# Patient Record
Sex: MALE | Race: WHITE | NOT HISPANIC OR LATINO | URBAN - METROPOLITAN AREA
[De-identification: names, ages, dates, MRNs, and addresses within clinical notes are randomized per-mention and may not be internally consistent; named-entity substitution may affect disease eponyms.]

---

## 2019-07-06 ENCOUNTER — INPATIENT (INPATIENT)
Facility: HOSPITAL | Age: 79
LOS: 1 days | Discharge: HOME | End: 2019-07-08
Attending: INTERNAL MEDICINE | Admitting: INTERNAL MEDICINE
Payer: MEDICARE

## 2019-07-06 VITALS
SYSTOLIC BLOOD PRESSURE: 160 MMHG | HEART RATE: 100 BPM | DIASTOLIC BLOOD PRESSURE: 97 MMHG | OXYGEN SATURATION: 99 % | TEMPERATURE: 96 F | RESPIRATION RATE: 18 BRPM

## 2019-07-06 LAB
ALBUMIN SERPL ELPH-MCNC: 4 G/DL — SIGNIFICANT CHANGE UP (ref 3.5–5.2)
ALP SERPL-CCNC: 70 U/L — SIGNIFICANT CHANGE UP (ref 30–115)
ALT FLD-CCNC: 248 U/L — HIGH (ref 0–41)
ANION GAP SERPL CALC-SCNC: 15 MMOL/L — HIGH (ref 7–14)
APTT BLD: 26.8 SEC — LOW (ref 27–39.2)
AST SERPL-CCNC: 232 U/L — HIGH (ref 0–41)
BASE EXCESS BLDV CALC-SCNC: -3.6 MMOL/L — LOW (ref -2–2)
BILIRUB SERPL-MCNC: 0.5 MG/DL — SIGNIFICANT CHANGE UP (ref 0.2–1.2)
BUN SERPL-MCNC: 24 MG/DL — HIGH (ref 10–20)
CA-I SERPL-SCNC: 1.13 MMOL/L — SIGNIFICANT CHANGE UP (ref 1.12–1.3)
CALCIUM SERPL-MCNC: 8.8 MG/DL — SIGNIFICANT CHANGE UP (ref 8.5–10.1)
CHLORIDE SERPL-SCNC: 105 MMOL/L — SIGNIFICANT CHANGE UP (ref 98–110)
CK SERPL-CCNC: 472 U/L — HIGH (ref 0–225)
CO2 SERPL-SCNC: 20 MMOL/L — SIGNIFICANT CHANGE UP (ref 17–32)
CREAT SERPL-MCNC: 1.2 MG/DL — SIGNIFICANT CHANGE UP (ref 0.7–1.5)
GAS PNL BLDV: 139 MMOL/L — SIGNIFICANT CHANGE UP (ref 136–145)
GAS PNL BLDV: SIGNIFICANT CHANGE UP
GLUCOSE BLDC GLUCOMTR-MCNC: 147 MG/DL — HIGH (ref 70–99)
GLUCOSE SERPL-MCNC: 253 MG/DL — HIGH (ref 70–99)
HCO3 BLDV-SCNC: 22 MMOL/L — SIGNIFICANT CHANGE UP (ref 22–29)
HCT VFR BLD CALC: 41.8 % — LOW (ref 42–52)
HCT VFR BLDA CALC: 41.9 % — SIGNIFICANT CHANGE UP (ref 34–44)
HGB BLD CALC-MCNC: 13.7 G/DL — LOW (ref 14–18)
HGB BLD-MCNC: 13.6 G/DL — LOW (ref 14–18)
INR BLD: 0.99 RATIO — SIGNIFICANT CHANGE UP (ref 0.65–1.3)
LACTATE BLDV-MCNC: 4.4 MMOL/L — HIGH (ref 0.5–1.6)
MAGNESIUM SERPL-MCNC: 2.2 MG/DL — SIGNIFICANT CHANGE UP (ref 1.8–2.4)
MCHC RBC-ENTMCNC: 27.9 PG — SIGNIFICANT CHANGE UP (ref 27–31)
MCHC RBC-ENTMCNC: 32.5 G/DL — SIGNIFICANT CHANGE UP (ref 32–37)
MCV RBC AUTO: 85.8 FL — SIGNIFICANT CHANGE UP (ref 80–94)
NRBC # BLD: 0 /100 WBCS — SIGNIFICANT CHANGE UP (ref 0–0)
NT-PROBNP SERPL-SCNC: 96 PG/ML — SIGNIFICANT CHANGE UP (ref 0–300)
PCO2 BLDV: 42 MMHG — SIGNIFICANT CHANGE UP (ref 41–51)
PH BLDV: 7.33 — SIGNIFICANT CHANGE UP (ref 7.26–7.43)
PLATELET # BLD AUTO: 230 K/UL — SIGNIFICANT CHANGE UP (ref 130–400)
PO2 BLDV: 41 MMHG — HIGH (ref 20–40)
POTASSIUM BLDV-SCNC: 4.2 MMOL/L — SIGNIFICANT CHANGE UP (ref 3.3–5.6)
POTASSIUM SERPL-MCNC: 4.5 MMOL/L — SIGNIFICANT CHANGE UP (ref 3.5–5)
POTASSIUM SERPL-SCNC: 4.5 MMOL/L — SIGNIFICANT CHANGE UP (ref 3.5–5)
PROT SERPL-MCNC: 7.1 G/DL — SIGNIFICANT CHANGE UP (ref 6–8)
PROTHROM AB SERPL-ACNC: 11.4 SEC — SIGNIFICANT CHANGE UP (ref 9.95–12.87)
RBC # BLD: 4.87 M/UL — SIGNIFICANT CHANGE UP (ref 4.7–6.1)
RBC # FLD: 12.8 % — SIGNIFICANT CHANGE UP (ref 11.5–14.5)
SAO2 % BLDV: 73 % — SIGNIFICANT CHANGE UP
SODIUM SERPL-SCNC: 140 MMOL/L — SIGNIFICANT CHANGE UP (ref 135–146)
TROPONIN T SERPL-MCNC: <0.01 NG/ML — SIGNIFICANT CHANGE UP
WBC # BLD: 9.77 K/UL — SIGNIFICANT CHANGE UP (ref 4.8–10.8)
WBC # FLD AUTO: 9.77 K/UL — SIGNIFICANT CHANGE UP (ref 4.8–10.8)

## 2019-07-06 PROCEDURE — 93010 ELECTROCARDIOGRAM REPORT: CPT

## 2019-07-06 PROCEDURE — 72125 CT NECK SPINE W/O DYE: CPT | Mod: 26

## 2019-07-06 PROCEDURE — 99291 CRITICAL CARE FIRST HOUR: CPT

## 2019-07-06 PROCEDURE — 71045 X-RAY EXAM CHEST 1 VIEW: CPT | Mod: 26

## 2019-07-06 PROCEDURE — 70450 CT HEAD/BRAIN W/O DYE: CPT | Mod: 26

## 2019-07-06 RX ORDER — ENOXAPARIN SODIUM 100 MG/ML
40 INJECTION SUBCUTANEOUS EVERY 24 HOURS
Refills: 0 | Status: DISCONTINUED | OUTPATIENT
Start: 2019-07-06 | End: 2019-07-07

## 2019-07-06 RX ORDER — TICAGRELOR 90 MG/1
90 TABLET ORAL EVERY 12 HOURS
Refills: 0 | Status: COMPLETED | OUTPATIENT
Start: 2019-07-06 | End: 2019-07-07

## 2019-07-06 RX ORDER — ATORVASTATIN CALCIUM 80 MG/1
80 TABLET, FILM COATED ORAL AT BEDTIME
Refills: 0 | Status: DISCONTINUED | OUTPATIENT
Start: 2019-07-06 | End: 2019-07-08

## 2019-07-06 RX ORDER — MORPHINE SULFATE 50 MG/1
1 CAPSULE, EXTENDED RELEASE ORAL ONCE
Refills: 0 | Status: DISCONTINUED | OUTPATIENT
Start: 2019-07-06 | End: 2019-07-06

## 2019-07-06 RX ORDER — ASPIRIN/CALCIUM CARB/MAGNESIUM 324 MG
81 TABLET ORAL DAILY
Refills: 0 | Status: DISCONTINUED | OUTPATIENT
Start: 2019-07-06 | End: 2019-07-08

## 2019-07-06 RX ORDER — ASPIRIN/CALCIUM CARB/MAGNESIUM 324 MG
162 TABLET ORAL ONCE
Refills: 0 | Status: COMPLETED | OUTPATIENT
Start: 2019-07-06 | End: 2019-07-06

## 2019-07-06 RX ORDER — MORPHINE SULFATE 50 MG/1
1 CAPSULE, EXTENDED RELEASE ORAL ONCE
Refills: 0 | Status: DISCONTINUED | OUTPATIENT
Start: 2019-07-06 | End: 2019-07-08

## 2019-07-06 RX ORDER — CLOPIDOGREL BISULFATE 75 MG/1
600 TABLET, FILM COATED ORAL ONCE
Refills: 0 | Status: COMPLETED | OUTPATIENT
Start: 2019-07-06 | End: 2019-07-06

## 2019-07-06 RX ADMIN — MORPHINE SULFATE 1 MILLIGRAM(S): 50 CAPSULE, EXTENDED RELEASE ORAL at 15:47

## 2019-07-06 RX ADMIN — TICAGRELOR 90 MILLIGRAM(S): 90 TABLET ORAL at 22:03

## 2019-07-06 RX ADMIN — Medication 162 MILLIGRAM(S): at 10:42

## 2019-07-06 RX ADMIN — MORPHINE SULFATE 1 MILLIGRAM(S): 50 CAPSULE, EXTENDED RELEASE ORAL at 16:54

## 2019-07-06 RX ADMIN — ATORVASTATIN CALCIUM 80 MILLIGRAM(S): 80 TABLET, FILM COATED ORAL at 22:03

## 2019-07-06 RX ADMIN — MORPHINE SULFATE 1 MILLIGRAM(S): 50 CAPSULE, EXTENDED RELEASE ORAL at 21:32

## 2019-07-06 RX ADMIN — CLOPIDOGREL BISULFATE 600 MILLIGRAM(S): 75 TABLET, FILM COATED ORAL at 10:42

## 2019-07-06 RX ADMIN — MORPHINE SULFATE 1 MILLIGRAM(S): 50 CAPSULE, EXTENDED RELEASE ORAL at 21:35

## 2019-07-06 NOTE — H&P ADULT - NSHPPHYSICALEXAM_GEN_ALL_CORE
PHYSICAL EXAM:  GENERAL: NAD, speaks in full sentences, no signs of respiratory distress  HEAD:  Atraumatic, Normocephalic  EYES: Anicteric  NECK: Supple, No JVD  CHEST/LUNG: Clear to auscultation bilaterally; No wheeze; No crackles; No accessory muscles used  HEART: Regular rate and rhythm; No murmurs;   ABDOMEN: Soft, Nontender, Nondistended; Bowel sounds present; No guarding  EXTREMITIES:  2+ Peripheral Pulses, No cyanosis or edema  PSYCH: AAOx3  NEUROLOGY: non-focal  SKIN: No rashes or lesions

## 2019-07-06 NOTE — ED ADULT NURSE NOTE - CHIEF COMPLAINT QUOTE
pre note, post cardiac arrest, awake and alert, chest pain pre note, post cardiac arrest, ROSC, awake and alert on arrival, chest pain

## 2019-07-06 NOTE — ED PROVIDER NOTE - PHYSICAL EXAMINATION
Vital Signs: I have reviewed the initial vital signs.  Constitutional: NAD, well-nourished, appears stated age, no acute distress. mildly diaphoretic  HEENT: Airway patent, moist MM, no erythema/swelling/deformity of oral structures. EOMI, PERRLA.  CV: regular rate, regular rhythm, well-perfused extremities, 2+ b/l DP and radial pulses equal.  Lungs: BCTA, no increased WOB.  ABD: NTND, no guarding or rebound, no pulsatile mass, no hernias.   MSK: Neck supple, nontender, nl ROM, no stepoff. Chest nontender. Back nontender in TLS spine or to b/l bony structures or flanks. Ext nontender, nl rom, no deformity.   INTEG: Skin warm, dry, no rash.  NEURO: A&Ox3, moving all extremities, normal speech  PSYCH: Calm, cooperative, normal affect and interaction.

## 2019-07-06 NOTE — ED PROVIDER NOTE - CARE PLAN
Principal Discharge DX:	Cardiac arrest with ventricular fibrillation  Secondary Diagnosis:	ST elevation myocardial infarction (STEMI), unspecified artery

## 2019-07-06 NOTE — H&P ADULT - ASSESSMENT
79 yo M with no PMHx, no home meds, hasn't seen physician in years, presents to ER in vfib cardiac arrest. Pt obtained ROSC upon arrival, STEMI found. Had cardiac cath showing occulsion in LAD and LCx    STEMI  -s/p cath  -c/w aspirin, lipitor, brillinta  -pt requires ICU monitoring  -2d Echo pending    DVT px  Full Code  From home

## 2019-07-06 NOTE — ED PROVIDER NOTE - CRITICAL CARE PROVIDED
consult w/ pt's family directly relating to pts condition/additional history taking/interpretation of diagnostic studies/consultation with other physicians/documentation/direct patient care (not related to procedure)

## 2019-07-06 NOTE — ED PROVIDER NOTE - OBJECTIVE STATEMENT
78yM with no significant PMH and surgical history presents via ems with CP, s/p cardiac arrest with rosc. The symptoms started today morning. He was feeling pale and had chest discomfort. He also passed out while sitting on the chair talking to the family he hit his head. Son found him pulseless and immediately initiated CPR. EMS was called, found to be in Vfib, shocked with ROSC in field. no meds given. code stemi activated on arrival to ed and pt went directly to cath lab after initial eval.

## 2019-07-06 NOTE — CONSULT NOTE ADULT - ASSESSMENT
79 YO M with no significant PMH reported by the patient, however, he does not see any doctors, brought in from home s/p cardiac arrest, found to have STEMI, to be taken up to cath lab.    A & P:    # STEMI:  - typical chest pain, with ST elevations in II, III, aVF  - give , Plavix [id number]  - plan to take up to the cath lab for PCI  - Detailed PCI/cath note to follow

## 2019-07-06 NOTE — ED ADULT TRIAGE NOTE - CHIEF COMPLAINT QUOTE
pre note, post cardiac arrest, awake and alert, chest pain pre note, post cardiac arrest, ROSC, awake and alert, chest pain

## 2019-07-06 NOTE — ED PROVIDER NOTE - ATTENDING CONTRIBUTION TO CARE
78 y.o. male BIBA after v.fib cardiac arrest. Pt was c/o chest pressure earlier today,  then collapsed on the floor, wife called 911, when they arrived he was found to be in v.fib and agonal breathing. Was shocked once, had CPR for less than 2 min, no meds. Now awake and alert, c/o chest pressure. No HA, neck pain, n/v/c/d. No diaphoresis. (+) mild SOB. On exam, pt in NAD, AAOx3, head (+) small abrasion to forehead, CN II-XII intact, lungs CTA B/L, CV S1S2 regular, abdomen soft/NT/ND/(+)BS, ext (-) edema, motor 5/5x4, sensation intact. STEMI activated on arrival. Pt to go to CATH lab.

## 2019-07-06 NOTE — CHART NOTE - NSCHARTNOTEFT_GEN_A_CORE
Preliminary Cardiac Catheterization Post-Procedure Report:07-06-19 @ 12:44    Procedure Performed:  [X] Left Heart Catheterization  [ ] Right Heart Catheterization  [X] Percutaneous Coronary Intervention    Primary Physician: Dr. Mi MD  Assistant(s): Dr. Agusto MD and  Dr. Mendoza MD    Preliminary Procedure Summary (Official full report to follow)    Pre-procedure diagnosis: STEMI  Post Procedure Diagnosis/Impression: STEMI with AILYN to proximal LCX    Left Heart Catheterization:  approximate EF%: 35%  [ ] Normal Coronary Arteries  [ ] Luminal Irregularities  [ ] non-obstructive CAD  [X] 2 vessel coronary artery disease       Anesthesia Type  [x] conscious sedation  [x] local/regional anesthesia  [  ] general anesthesia    Estimated Blood Loss  [x ] less than 30 ml    Amount of Contrast used:  150 ml    Access  [ ] Rt. Femoral A  [ ] Rt. Femoral V  [ x] Rt. Radial A  [ ] Rt. Brachial V    Condition of patient after procedure  [x] stable  [  ] guarded  [  ] satisfactory     CATH SUMMARY/FINDINGS:    Dominance:   [X] Co-dominant  [ ] Right  [ ] Left                  Coronary circulation: There was 2-vessel coronary artery disease (LAD and circumflex). Left main: Angiography showed a single discrete lesion. Distal left main: There was a 40 % stenosis. LAD: Angiography showed a single discrete lesion. Mid LAD: There was a discrete 70 % stenosis. It appears amenable to percutaneous intervention. Circumflex: Angiography showed a single discrete lesion. Proximal circumflex: There was a tubular 95 % stenosis. This lesion is a likely culprit for the patient's recent myocardial infarction. An intervention was performed. RCA: Angiography showed a single discrete lesion. Mid RCA: Angiography showed mild atherosclerosis with no flow limiting lesions. Distal RCA: There was a tubular 40 % stenosis just before RPDA.     Lesion #1 intervention: A balloon angioplasty was performed on the lesion in the proximal circumflex. There was no dissection. Vessel setup was performed. A EBU3.0 Launcher guiding catheter was used to intubate the vessel. A Runthrough NS 180cm wire was used to cross the lesion. Vessel setup was performed. A BMW .014 x 190cm wire was used to protect a branch in the LAD. Balloon angioplasty was performed, using a 2.0 x 12 NC Quantum [place] RX balloon, with 2 inflations and a maximum inflation pressure of 10 raj.   A 2.75 X 16 Synergy RX drug-eluting stent was placed across the lesion and deployed at a maximum inflation pressure of 14 raj.     Lesion #2 intervention: A successful balloon angioplasty was performed on the 80 % lesion in the 1st obtuse marginal. Following intervention there was an improved angiographic appearance with a 10 % residual stenosis.   Balloon angioplasty was performed, using a 2.0 x 12 NC Quantum [place] RX balloon, with 1 inflations and a maximum inflation pressure of 4 raj. During the procedure, a new Runthrough NS 180cm wire was advanced across the lesion. The resulting stenosis was 10 %.       Specimens obtained: n/a    Implants: AILYN x 1    Follow-up Care:  [ ] D/C Home today  [ ] Return to In-patient bed  [X] Admit to CCU  [ ] Return for staged procedure  [ ] CT Surgery consult called  [X] DAPT, statin, BB, ACEI  [x ] intensive medical management  [ ] EPS/nephrology evaluation requested

## 2019-07-06 NOTE — H&P ADULT - NSHPLABSRESULTS_GEN_ALL_CORE
13.6   11.57 )-----------( 203      ( 07 Jul 2019 05:37 )             41.8       07-07    138  |  104  |  22<H>  ----------------------------<  119<H>  4.0   |  23  |  1.0    Ca    8.9      07 Jul 2019 05:37  Mg     2.2     07-06    TPro  7.1  /  Alb  4.0  /  TBili  0.5  /  DBili  x   /  AST  232<H>  /  ALT  248<H>  /  AlkPhos  70  07-06                  PT/INR - ( 06 Jul 2019 10:31 )   PT: 11.40 sec;   INR: 0.99 ratio         PTT - ( 06 Jul 2019 10:31 )  PTT:26.8 sec    Lactate Trend      CARDIAC MARKERS ( 07 Jul 2019 05:37 )  x     / 1.97 ng/mL / x     / x     / x      CARDIAC MARKERS ( 06 Jul 2019 17:34 )  x     / x     / 472 U/L / x     / x      CARDIAC MARKERS ( 06 Jul 2019 10:31 )  x     / <0.01 ng/mL / x     / x     / x            CAPILLARY BLOOD GLUCOSE      POCT Blood Glucose.: 147 mg/dL (06 Jul 2019 12:27)        EKG reviewed independently-STEMI

## 2019-07-06 NOTE — CHART NOTE - NSCHARTNOTEFT_GEN_A_CORE
STEMI code overheaded to which I responded and arrived to asses patient. EKG reviewed and patient examined personally by myself. Case d/w Interventional Cardiology attending (Dr. Stark), patient assessed to require emergent cardiac catheterization. Patient given medical treatment as per ACS protocol, will be transferred to cath lab for emergent PCI. Patient informed regarding this and agrees to treatment plan. Case d/w ED staff by myself. Detailed consult note to follow.   EKG: ST elevations in inf limb leads

## 2019-07-06 NOTE — ED ADULT NURSE NOTE - OBJECTIVE STATEMENT
pt presents s/p cardiac arrest, witnessed collapse, CPR, vfib, shocked, no meds given on field, ROSC achieved, awake and alert, c-collar in place.

## 2019-07-06 NOTE — ED ADULT NURSE NOTE - NSIMPLEMENTINTERV_GEN_ALL_ED
Implemented All Fall with Harm Risk Interventions:  [place] to call system. Call bell, personal items and telephone within reach. Instruct patient to call for assistance. Room bathroom lighting operational. Non-slip footwear when patient is off stretcher. Physically safe environment: no spills, clutter or unnecessary equipment. Stretcher in lowest position, wheels locked, appropriate side rails in place. Provide visual cue, wrist band, yellow gown, etc. Monitor gait and stability. Monitor for mental status changes and reorient to person, place, and time. Review medications for side effects contributing to fall risk. Reinforce activity limits and safety measures with patient and family. Provide visual clues: red socks.

## 2019-07-06 NOTE — CONSULT NOTE ADULT - SUBJECTIVE AND OBJECTIVE BOX
HPI:  77 YO M with no significant PMH reported by the patient, however, he does not see any doctors, brought in from home s/p cardiac arrest. As per the wife pt. complained of retrosternal chest pressure and not feeling well, once he came home from walking outside, and collpased in the chair. EMS was called who found the patient in V.Fib, shocked him once and coded for 2 minutes, until ROSC was achieved.   Pt. was found to have STEMI with RADHA in inferior leads.     In the ER pt's BP was 160/97, , Temp 96.4F, SaO2 99% on Nc    PAST MEDICAL & SURGICAL HISTORY  None reported    FAMILY HISTORY:  No pertinent family history of premature CAD      SOCIAL HISTORY:  []smoker  []Alcohol  []Drug    ALLERGIES:  No Known Allergies      MEDICATIONS:  MEDICATIONS  (STANDING):   mg X1  Plavix 600 mg X1  MEDICATIONS  (PRN):      HOME MEDICATIONS:  Home Medications:  nONE    VITALS:   T(F): 96.4 (07-06 @ 10:34), Max: 96.4 (07-06 @ 10:28)  HR: 81 (07-06 @ 10:42) (79 - 100)  BP: 169/98 (07-06 @ 10:42) (156/90 - 169/98)  BP(mean): --  RR: 17 (07-06 @ 10:34) (17 - 18)  SpO2: 99% (07-06 @ 10:34) (99% - 99%)    I&O's Summary      REVIEW OF SYSTEMS:  CONSTITUTIONAL: No weakness, fevers or chills  EYES/ENT: No visual changes;  No vertigo or throat pain   NECK: No pain or stiffness  RESPIRATORY: No cough, wheezing, hemoptysis; No shortness of breath  CARDIOVASCULAR: Chest pain as per hpi,, NO  palpitations  GASTROINTESTINAL: No abdominal or epigastric pain. No nausea, vomiting, or hematemesis; No diarrhea or constipation. No melena or hematochezia.  GENITOURINARY: No dysuria, frequency or hematuria  NEUROLOGICAL: No numbness or weakness  SKIN: No itching, no rashes    PHYSICAL EXAM:  NEURO: patient is awake , alert and oriented  GEN: Not in acute distress  NECK: no thyroid enlargement, no JVD  LUNGS: Clear to auscultation bilaterally   CARDIOVASCULAR: S1/S2 present, RRR , no murmurs or rubs, no carotid bruits,  + PP bilaterally  ABD: Soft, non-tender, non-distended, +BS  EXT: No LEANDRA  SKIN: Intact    LABS:                        13.6   9.77  )-----------( 230      ( 06 Jul 2019 10:31 )             41.8     07-06    140  |  105  |  24<H>  ----------------------------<  253<H>  4.5   |  20  |  1.2    Ca    8.8      06 Jul 2019 10:31  Mg     2.2     07-06    TPro  7.1  /  Alb  4.0  /  TBili  0.5  /  DBili  x   /  AST  232<H>  /  ALT  248<H>  /  AlkPhos  70  07-06    PT/INR - ( 06 Jul 2019 10:31 )   PT: 11.40 sec;   INR: 0.99 ratio         PTT - ( 06 Jul 2019 10:31 )  PTT:26.8 sec  Troponin T, Serum: <0.01 ng/mL (07-06-19 @ 10:31)    CARDIAC MARKERS ( 06 Jul 2019 10:31 )  x     / <0.01 ng/mL / x     / x     / x        Serum Pro-Brain Natriuretic Peptide: 96 pg/mL (07-06-19 @ 10:31)      ECG:  ST elevations in II, III, aVF

## 2019-07-07 LAB
ANION GAP SERPL CALC-SCNC: 11 MMOL/L — SIGNIFICANT CHANGE UP (ref 7–14)
BASOPHILS # BLD AUTO: 0.01 K/UL — SIGNIFICANT CHANGE UP (ref 0–0.2)
BASOPHILS NFR BLD AUTO: 0.1 % — SIGNIFICANT CHANGE UP (ref 0–1)
BUN SERPL-MCNC: 22 MG/DL — HIGH (ref 10–20)
CALCIUM SERPL-MCNC: 8.9 MG/DL — SIGNIFICANT CHANGE UP (ref 8.5–10.1)
CHLORIDE SERPL-SCNC: 104 MMOL/L — SIGNIFICANT CHANGE UP (ref 98–110)
CHOLEST SERPL-MCNC: 253 MG/DL — HIGH (ref 100–200)
CO2 SERPL-SCNC: 23 MMOL/L — SIGNIFICANT CHANGE UP (ref 17–32)
CREAT SERPL-MCNC: 1 MG/DL — SIGNIFICANT CHANGE UP (ref 0.7–1.5)
EOSINOPHIL # BLD AUTO: 0.03 K/UL — SIGNIFICANT CHANGE UP (ref 0–0.7)
EOSINOPHIL NFR BLD AUTO: 0.3 % — SIGNIFICANT CHANGE UP (ref 0–8)
GLUCOSE SERPL-MCNC: 119 MG/DL — HIGH (ref 70–99)
HCT VFR BLD CALC: 41.8 % — LOW (ref 42–52)
HDLC SERPL-MCNC: 42 MG/DL — SIGNIFICANT CHANGE UP
HGB BLD-MCNC: 13.6 G/DL — LOW (ref 14–18)
IMM GRANULOCYTES NFR BLD AUTO: 0.2 % — SIGNIFICANT CHANGE UP (ref 0.1–0.3)
LIPID PNL WITH DIRECT LDL SERPL: 203 MG/DL — HIGH (ref 4–129)
LYMPHOCYTES # BLD AUTO: 1.03 K/UL — LOW (ref 1.2–3.4)
LYMPHOCYTES # BLD AUTO: 8.9 % — LOW (ref 20.5–51.1)
MCHC RBC-ENTMCNC: 27.7 PG — SIGNIFICANT CHANGE UP (ref 27–31)
MCHC RBC-ENTMCNC: 32.5 G/DL — SIGNIFICANT CHANGE UP (ref 32–37)
MCV RBC AUTO: 85.1 FL — SIGNIFICANT CHANGE UP (ref 80–94)
MONOCYTES # BLD AUTO: 1.24 K/UL — HIGH (ref 0.1–0.6)
MONOCYTES NFR BLD AUTO: 10.7 % — HIGH (ref 1.7–9.3)
NEUTROPHILS # BLD AUTO: 9.24 K/UL — HIGH (ref 1.4–6.5)
NEUTROPHILS NFR BLD AUTO: 79.8 % — HIGH (ref 42.2–75.2)
NRBC # BLD: 0 /100 WBCS — SIGNIFICANT CHANGE UP (ref 0–0)
PLATELET # BLD AUTO: 203 K/UL — SIGNIFICANT CHANGE UP (ref 130–400)
POTASSIUM SERPL-MCNC: 4 MMOL/L — SIGNIFICANT CHANGE UP (ref 3.5–5)
POTASSIUM SERPL-SCNC: 4 MMOL/L — SIGNIFICANT CHANGE UP (ref 3.5–5)
RBC # BLD: 4.91 M/UL — SIGNIFICANT CHANGE UP (ref 4.7–6.1)
RBC # FLD: 12.8 % — SIGNIFICANT CHANGE UP (ref 11.5–14.5)
SODIUM SERPL-SCNC: 138 MMOL/L — SIGNIFICANT CHANGE UP (ref 135–146)
TOTAL CHOLESTEROL/HDL RATIO MEASUREMENT: 6 RATIO — HIGH (ref 4–5.5)
TRIGL SERPL-MCNC: 129 MG/DL — SIGNIFICANT CHANGE UP (ref 10–149)
TROPONIN T SERPL-MCNC: 1.97 NG/ML — CRITICAL HIGH
WBC # BLD: 11.57 K/UL — HIGH (ref 4.8–10.8)
WBC # FLD AUTO: 11.57 K/UL — HIGH (ref 4.8–10.8)

## 2019-07-07 PROCEDURE — 99223 1ST HOSP IP/OBS HIGH 75: CPT

## 2019-07-07 PROCEDURE — 93010 ELECTROCARDIOGRAM REPORT: CPT

## 2019-07-07 PROCEDURE — 93306 TTE W/DOPPLER COMPLETE: CPT | Mod: 26

## 2019-07-07 PROCEDURE — 76705 ECHO EXAM OF ABDOMEN: CPT | Mod: 26

## 2019-07-07 RX ORDER — MORPHINE SULFATE 50 MG/1
2 CAPSULE, EXTENDED RELEASE ORAL ONCE
Refills: 0 | Status: DISCONTINUED | OUTPATIENT
Start: 2019-07-07 | End: 2019-07-07

## 2019-07-07 RX ORDER — ENOXAPARIN SODIUM 100 MG/ML
40 INJECTION SUBCUTANEOUS DAILY
Refills: 0 | Status: DISCONTINUED | OUTPATIENT
Start: 2019-07-07 | End: 2019-07-08

## 2019-07-07 RX ORDER — ACETAMINOPHEN 500 MG
650 TABLET ORAL ONCE
Refills: 0 | Status: COMPLETED | OUTPATIENT
Start: 2019-07-07 | End: 2019-07-07

## 2019-07-07 RX ORDER — METOPROLOL TARTRATE 50 MG
12.5 TABLET ORAL EVERY 12 HOURS
Refills: 0 | Status: DISCONTINUED | OUTPATIENT
Start: 2019-07-07 | End: 2019-07-08

## 2019-07-07 RX ADMIN — TICAGRELOR 90 MILLIGRAM(S): 90 TABLET ORAL at 09:01

## 2019-07-07 RX ADMIN — ATORVASTATIN CALCIUM 80 MILLIGRAM(S): 80 TABLET, FILM COATED ORAL at 21:09

## 2019-07-07 RX ADMIN — Medication 650 MILLIGRAM(S): at 17:30

## 2019-07-07 RX ADMIN — Medication 81 MILLIGRAM(S): at 13:29

## 2019-07-07 RX ADMIN — ENOXAPARIN SODIUM 40 MILLIGRAM(S): 100 INJECTION SUBCUTANEOUS at 13:29

## 2019-07-07 RX ADMIN — TICAGRELOR 90 MILLIGRAM(S): 90 TABLET ORAL at 17:51

## 2019-07-07 RX ADMIN — MORPHINE SULFATE 2 MILLIGRAM(S): 50 CAPSULE, EXTENDED RELEASE ORAL at 06:43

## 2019-07-07 RX ADMIN — Medication 650 MILLIGRAM(S): at 14:10

## 2019-07-07 RX ADMIN — Medication 650 MILLIGRAM(S): at 23:39

## 2019-07-07 RX ADMIN — Medication 650 MILLIGRAM(S): at 23:08

## 2019-07-07 RX ADMIN — Medication 12.5 MILLIGRAM(S): at 17:51

## 2019-07-07 RX ADMIN — Medication 650 MILLIGRAM(S): at 08:00

## 2019-07-07 RX ADMIN — MORPHINE SULFATE 2 MILLIGRAM(S): 50 CAPSULE, EXTENDED RELEASE ORAL at 07:24

## 2019-07-07 NOTE — PROGRESS NOTE ADULT - SUBJECTIVE AND OBJECTIVE BOX
SUBJ: was dizzy when got up      MEDICATIONS  (STANDING):  aspirin  chewable 81 milliGRAM(s) Chew daily  atorvastatin 80 milliGRAM(s) Oral at bedtime  enoxaparin Injectable 40 milliGRAM(s) SubCutaneous daily  morphine  - Injectable 1 milliGRAM(s) IV Push once  ticagrelor 90 milliGRAM(s) Oral every 12 hours    MEDICATIONS  (PRN):            Vital Signs Last 24 Hrs  T(C): 36.8 (07 Jul 2019 11:32), Max: 37.1 (07 Jul 2019 00:00)  T(F): 98.2 (07 Jul 2019 11:32), Max: 98.8 (07 Jul 2019 00:00)  HR: 68 (07 Jul 2019 11:32) (54 - 90)  BP: 136/69 (07 Jul 2019 11:32) (107/62 - 149/79)  BP(mean): 89 (07 Jul 2019 11:32) (75 - 112)  RR: 17 (07 Jul 2019 11:32) (12 - 33)  SpO2: 96% (07 Jul 2019 11:32) (94% - 99%)     REVIEW OF SYSTEMS:  CONSTITUTIONAL: No fever, weight loss, or fatigue  CARDIOLOGY: PAtient denies chest pain, shortness of breath or syncopal episodes.   RESPIRATORY: denies shortness of breath, wheezeing.   NEUROLOGICAL: NO weakness, no focal deficits to report.  ENDOCRINOLOGICAL: no recent change in diabetic medications.   GI: no BRBPR, no N,V,diarrhea.    PSYCHIATRY: normal mood and affect  HEENT: no nasal discharge, no ecchymosis  SKIN: no ecchymosis, no breakdown  MUSCULOSKELETAL: Full range of motion x4.        PHYSICAL EXAM:  · CONSTITUTIONAL:	Well-developed, well nourished    BMI-  ·RESPIRATORY:   airway patent; breath sounds equal; good air movement; respirations non-labored; clear to auscultation bilaterally; no chest wall tenderness; no intercostal retractions; no rales,rhonchi or wheeze  · CARDIOVASCULAR	regular rate and rhythm  no rub  no murmur  normal PMI  · EXTREMITIES: No cyanosis, clubbing or edema  · VASCULAR: 	Equal and normal pulses (carotid, femoral, dorsalis pedis)  	  TELEMETRY:    ECG:    TTE:    LABS:                        13.6   11.57 )-----------( 203      ( 07 Jul 2019 05:37 )             41.8     07-07    138  |  104  |  22<H>  ----------------------------<  119<H>  4.0   |  23  |  1.0    Ca    8.9      07 Jul 2019 05:37  Mg     2.2     07-06    TPro  7.1  /  Alb  4.0  /  TBili  0.5  /  DBili  x   /  AST  232<H>  /  ALT  248<H>  /  AlkPhos  70  07-06    CARDIAC MARKERS ( 07 Jul 2019 05:37 )  x     / 1.97 ng/mL / x     / x     / x      CARDIAC MARKERS ( 06 Jul 2019 17:34 )  x     / x     / 472 U/L / x     / x      CARDIAC MARKERS ( 06 Jul 2019 10:31 )  x     / <0.01 ng/mL / x     / x     / x          PT/INR - ( 06 Jul 2019 10:31 )   PT: 11.40 sec;   INR: 0.99 ratio         PTT - ( 06 Jul 2019 10:31 )  PTT:26.8 sec    I&O's Summary    06 Jul 2019 07:01  -  07 Jul 2019 07:00  --------------------------------------------------------  IN: 0 mL / OUT: 1140 mL / NET: -1140 mL    07 Jul 2019 07:01  -  07 Jul 2019 11:58  --------------------------------------------------------  IN: 120 mL / OUT: 0 mL / NET: 120 mL      BNP  RADIOLOGY & ADDITIONAL STUDIES:    IMPRESSION AND PLAN:    Vfib cardiac arrest  PCI of the LCX with AILYN  continue with DAPT, start with Bblockers  follow up with echo

## 2019-07-08 ENCOUNTER — TRANSCRIPTION ENCOUNTER (OUTPATIENT)
Age: 79
End: 2019-07-08

## 2019-07-08 VITALS
HEART RATE: 72 BPM | TEMPERATURE: 98 F | SYSTOLIC BLOOD PRESSURE: 137 MMHG | DIASTOLIC BLOOD PRESSURE: 75 MMHG | OXYGEN SATURATION: 97 % | RESPIRATION RATE: 21 BRPM

## 2019-07-08 LAB
ANION GAP SERPL CALC-SCNC: 12 MMOL/L — SIGNIFICANT CHANGE UP (ref 7–14)
BUN SERPL-MCNC: 20 MG/DL — SIGNIFICANT CHANGE UP (ref 10–20)
CALCIUM SERPL-MCNC: 8.8 MG/DL — SIGNIFICANT CHANGE UP (ref 8.5–10.1)
CHLORIDE SERPL-SCNC: 103 MMOL/L — SIGNIFICANT CHANGE UP (ref 98–110)
CK SERPL-CCNC: 418 U/L — HIGH (ref 0–225)
CO2 SERPL-SCNC: 24 MMOL/L — SIGNIFICANT CHANGE UP (ref 17–32)
CREAT SERPL-MCNC: 1 MG/DL — SIGNIFICANT CHANGE UP (ref 0.7–1.5)
ESTIMATED AVERAGE GLUCOSE: 117 MG/DL — HIGH (ref 68–114)
GLUCOSE SERPL-MCNC: 109 MG/DL — HIGH (ref 70–99)
HBA1C BLD-MCNC: 5.7 % — HIGH (ref 4–5.6)
HCT VFR BLD CALC: 40.5 % — LOW (ref 42–52)
HGB BLD-MCNC: 13.1 G/DL — LOW (ref 14–18)
MCHC RBC-ENTMCNC: 27.6 PG — SIGNIFICANT CHANGE UP (ref 27–31)
MCHC RBC-ENTMCNC: 32.3 G/DL — SIGNIFICANT CHANGE UP (ref 32–37)
MCV RBC AUTO: 85.4 FL — SIGNIFICANT CHANGE UP (ref 80–94)
NRBC # BLD: 0 /100 WBCS — SIGNIFICANT CHANGE UP (ref 0–0)
PLATELET # BLD AUTO: 217 K/UL — SIGNIFICANT CHANGE UP (ref 130–400)
POTASSIUM SERPL-MCNC: 4.2 MMOL/L — SIGNIFICANT CHANGE UP (ref 3.5–5)
POTASSIUM SERPL-SCNC: 4.2 MMOL/L — SIGNIFICANT CHANGE UP (ref 3.5–5)
RBC # BLD: 4.74 M/UL — SIGNIFICANT CHANGE UP (ref 4.7–6.1)
RBC # FLD: 12.9 % — SIGNIFICANT CHANGE UP (ref 11.5–14.5)
SODIUM SERPL-SCNC: 139 MMOL/L — SIGNIFICANT CHANGE UP (ref 135–146)
TROPONIN T SERPL-MCNC: 1.26 NG/ML — CRITICAL HIGH
WBC # BLD: 9.91 K/UL — SIGNIFICANT CHANGE UP (ref 4.8–10.8)
WBC # FLD AUTO: 9.91 K/UL — SIGNIFICANT CHANGE UP (ref 4.8–10.8)

## 2019-07-08 PROCEDURE — 93010 ELECTROCARDIOGRAM REPORT: CPT

## 2019-07-08 PROCEDURE — 99238 HOSP IP/OBS DSCHRG MGMT 30/<: CPT

## 2019-07-08 RX ORDER — ATORVASTATIN CALCIUM 80 MG/1
1 TABLET, FILM COATED ORAL
Qty: 30 | Refills: 0
Start: 2019-07-08 | End: 2019-08-06

## 2019-07-08 RX ORDER — LISINOPRIL 2.5 MG/1
1 TABLET ORAL
Qty: 30 | Refills: 0
Start: 2019-07-08 | End: 2019-08-06

## 2019-07-08 RX ORDER — TICAGRELOR 90 MG/1
1 TABLET ORAL
Qty: 60 | Refills: 0
Start: 2019-07-08 | End: 2019-08-06

## 2019-07-08 RX ORDER — ASPIRIN/CALCIUM CARB/MAGNESIUM 324 MG
1 TABLET ORAL
Qty: 30 | Refills: 0
Start: 2019-07-08 | End: 2019-08-06

## 2019-07-08 RX ORDER — LISINOPRIL 2.5 MG/1
2.5 TABLET ORAL DAILY
Refills: 0 | Status: DISCONTINUED | OUTPATIENT
Start: 2019-07-08 | End: 2019-07-08

## 2019-07-08 RX ORDER — TICAGRELOR 90 MG/1
90 TABLET ORAL EVERY 12 HOURS
Refills: 0 | Status: DISCONTINUED | OUTPATIENT
Start: 2019-07-08 | End: 2019-07-08

## 2019-07-08 RX ORDER — CHLORHEXIDINE GLUCONATE 213 G/1000ML
1 SOLUTION TOPICAL
Refills: 0 | Status: DISCONTINUED | OUTPATIENT
Start: 2019-07-08 | End: 2019-07-08

## 2019-07-08 RX ORDER — ACETAMINOPHEN 500 MG
650 TABLET ORAL EVERY 6 HOURS
Refills: 0 | Status: DISCONTINUED | OUTPATIENT
Start: 2019-07-08 | End: 2019-07-08

## 2019-07-08 RX ORDER — LISINOPRIL 2.5 MG/1
5 TABLET ORAL DAILY
Refills: 0 | Status: DISCONTINUED | OUTPATIENT
Start: 2019-07-08 | End: 2019-07-08

## 2019-07-08 RX ORDER — METOPROLOL TARTRATE 50 MG
0.5 TABLET ORAL
Qty: 30 | Refills: 0
Start: 2019-07-08 | End: 2019-08-06

## 2019-07-08 RX ADMIN — ENOXAPARIN SODIUM 40 MILLIGRAM(S): 100 INJECTION SUBCUTANEOUS at 11:55

## 2019-07-08 RX ADMIN — LISINOPRIL 5 MILLIGRAM(S): 2.5 TABLET ORAL at 11:59

## 2019-07-08 RX ADMIN — Medication 81 MILLIGRAM(S): at 11:54

## 2019-07-08 RX ADMIN — Medication 650 MILLIGRAM(S): at 14:06

## 2019-07-08 RX ADMIN — Medication 650 MILLIGRAM(S): at 15:03

## 2019-07-08 RX ADMIN — CHLORHEXIDINE GLUCONATE 1 APPLICATION(S): 213 SOLUTION TOPICAL at 11:54

## 2019-07-08 RX ADMIN — Medication 650 MILLIGRAM(S): at 08:00

## 2019-07-08 RX ADMIN — TICAGRELOR 90 MILLIGRAM(S): 90 TABLET ORAL at 07:45

## 2019-07-08 RX ADMIN — Medication 12.5 MILLIGRAM(S): at 06:26

## 2019-07-08 RX ADMIN — Medication 650 MILLIGRAM(S): at 09:00

## 2019-07-08 NOTE — CHART NOTE - NSCHARTNOTEFT_GEN_A_CORE
CARDIOLOGY NP    Shanailinta coverage confirmed with [facility], copay $429 for 30 day supply, s/w insurance company after initial copay of $429, future copay will be $21/month, pt and family aware and agreeable, RX available for .

## 2019-07-08 NOTE — DISCHARGE NOTE PROVIDER - NSDCCPCAREPLAN_GEN_ALL_CORE_FT
PRINCIPAL DISCHARGE DIAGNOSIS  Diagnosis: Cardiac arrest with ventricular fibrillation  Assessment and Plan of Treatment: Ventricular fibrillation is when the heart quivers instead of pumping due to disorganized electrical sctivity in the ventricles.      SECONDARY DISCHARGE DIAGNOSES  Diagnosis: ST elevation myocardial infarction (STEMI), unspecified artery  Assessment and Plan of Treatment: You had a heart attack. This occurs when an artery supplying your heart with blood and oxygen becomes blocked. Fatty deposits build up over time , forming plaques in your heart's arteries. If a plaque ruptures, a blood clot can form and block your arteries, causing a heart attack. follow up with your primary care provider in one week, and your cardiologist in two weeks. Continue to take all medications upon discharge.

## 2019-07-08 NOTE — DISCHARGE NOTE PROVIDER - NSDCFUADDINST_GEN_ALL_CORE_FT
return to hospital if experience chest pain, shortness breath, dizziness and site bleeding     Aggressive risk factor modification, diet counseling, smoking cessation discussed with patient

## 2019-07-08 NOTE — DISCHARGE NOTE PROVIDER - PROVIDER TOKENS
PROVIDER:[TOKEN:[[id number]],FOLLOWUP:[1 week]],PROVIDER:[TOKEN:[[id number]],FOLLOWUP:[2 weeks]]

## 2019-07-08 NOTE — PROGRESS NOTE ADULT - SUBJECTIVE AND OBJECTIVE BOX
SUBJECTIVE:    Patient is a 78y old Male who presents with a chief complaint of Chest pain (08 Jul 2019 09:24)    Overnight Events:  Patient was seen at bed side this morning. patient denied any chest pain . patient states he feels sore when he moves around in his chest area which he attributes to the chest compressions. patient denied any sob, n/v, abdominal pain, diarrhea, constipation.     PAST MEDICAL & SURGICAL HISTORY  No pertinent past medical history  No significant past surgical history    SOCIAL HISTORY:  Negative for smoking/alcohol/drug use.     ALLERGIES:  No Known Allergies    MEDICATIONS:  STANDING MEDICATIONS  aspirin  chewable 81 milliGRAM(s) Chew daily  atorvastatin 80 milliGRAM(s) Oral at bedtime  enoxaparin Injectable 40 milliGRAM(s) SubCutaneous daily  lisinopril 2.5 milliGRAM(s) Oral daily  metoprolol tartrate 12.5 milliGRAM(s) Oral every 12 hours  morphine  - Injectable 1 milliGRAM(s) IV Push once  ticagrelor 90 milliGRAM(s) Oral every 12 hours    PRN MEDICATIONS  acetaminophen   Tablet .. 650 milliGRAM(s) Oral every 6 hours PRN    VITALS:   T(F): 98.9  HR: 70  BP: 118/65  RR: 21  SpO2: 95%    07-07-19 @ 07:01  -  07-08-19 @ 07:00  --------------------------------------------------------  IN: 360 mL / OUT: 1070 mL / NET: -710 mL    07-08-19 @ 07:01  - 07-08-19 @ 10:58  --------------------------------------------------------  IN: 240 mL / OUT: 400 mL / NET: -160 mL      PHYSICAL EXAM:  . General: NAD  . HEENT  · Respiratory: Breath Sounds equal & clear to  auscultation, no accessory muscle use  · Cardiovascular: Regular rate & rhythm, normal S1, S2; no murmurs, gallops or rubs; no S3, S4  · Gastrointestinal	Soft, non-tender, no hepatosplenomegaly, normal bowel sounds  · Genitourinary: Normal genitalia; no lesions; no discharge  · Extremities:	No cyanosis, clubbing or edema  · Skin no macular rashs, no lacerations.   . Neuro:    LABS:                        13.1   9.91  )-----------( 217      ( 08 Jul 2019 04:58 )             40.5     07-08    139  |  103  |  20  ----------------------------<  109<H>  4.2   |  24  |  1.0    Ca    8.8      08 Jul 2019 04:58            Troponin T, Serum: 1.26 ng/mL <HH> (07-08-19 @ 04:58)  Creatine Kinase, Serum: 418 U/L <H> (07-08-19 @ 04:58)      CARDIAC MARKERS ( 08 Jul 2019 04:58 )  x     / 1.26 ng/mL / 418 U/L / x     / x      CARDIAC MARKERS ( 07 Jul 2019 05:37 )  x     / 1.97 ng/mL / x     / x     / x      CARDIAC MARKERS ( 06 Jul 2019 17:34 )  x     / x     / 472 U/L / x     / x              07-07-19 @ 07:01  -  07-08-19 @ 07:00  --------------------------------------------------------  IN: 360 mL / OUT: 1070 mL / NET: -710 mL    07-08-19 @ 07:01  -  07-08-19 @ 10:58  --------------------------------------------------------  IN: 240 mL / OUT: 400 mL / NET: -160 mL          Radiology:      < from: 12 Lead ECG (07.08.19 @ 07:42) >    Ventricular Rate 60 BPM    Atrial Rate 60 BPM    P-R Interval 182 ms    QRS Duration 80 ms    Q-T Interval 432 ms    QTC Calculation(Bezet) 432 ms    P Axis 67 degrees    R Axis 57 degrees    T Axis -71 degrees    Diagnosis Line Normal sinus rhythm  Low voltage QRS  T wave abnormality, consider inferior ischemia  Abnormal ECG        Confirmed by Gilbert Jiang MD    < end of copied text >            < from: Transthoracic Echocardiogram (07.07.19 @ 09:12) >       Date of Exam:        7/7/2019 9:12:28 AM  Referring Physician: [id number] EDUARDO CERVANTES  Sonographer:         Ria Kong  Reading Physician:   Jaren Avery M.D.    Procedure:    2D Echo/Doppler/Color Doppler Complete.  Indications:  I21.3 - ST Elevation STEMI Myocardial Infarction of   unspecified  Diagnosis:    ST elevation (STEMI) myocardial infarction of unspecified   site -                I21.3  Study         Technically good study.  Details:         Summary:   1. Left ventricular ejection fraction, by visual estimation, is 40 to   45%.   2. Mildly decreased global left ventricular systolic function.   3. Multiple left ventricular regional wall motion abnormalities exist.   See wall motion findings.   4. LV Ejection Fraction by Bashir's Method with a biplane EF of 48 %.   5. Mildly increased left ventricular internal cavity size.   6. Spectral Doppler shows impaired relaxation pattern of left   ventricular myocardial filling (Grade I diastolic dysfunction).   7. Mild mitral valve regurgitation.   8. Mild thickening and calcification of the anterior mitral valve   leaflet.   9. Sclerotic aortic valve with normal opening.  10. LA volume Index is 22.7 ml/m² ml/m2.    PHYSICIAN INTERPRETATION:  Left Ventricle: The left ventricular internal cavity size is mildly   increased. Left ventricular wall thickness is normal. Global LV systolic   function was mildly decreased. Left ventricular ejection fraction, by   visual estimation, is 40 to 45%. Spectral Doppler shows impaired   relaxation pattern of left ventricular myocardialfilling (Grade I   diastolic dysfunction).    < end of copied text >

## 2019-07-08 NOTE — DISCHARGE NOTE PROVIDER - HOSPITAL COURSE
History of Present Illness:     79 yo M with NO significant PMH and surgical history comes to the ED with the c/o chest pain x 1 day. The symptoms started today morning. He was feeling pale and had chest discomfort. He also passed out while sitting on the chair talking to the family he hit his head. Son found him pulseless and immediately initiated CPR. EMS was called, found to be in Vfib. ROSC obtained in ER, code STEMI called and pt went to cath lab.    The patient was found to have occulusion in LAD 40 % occlusion and LCx 95% occlusion during cardiac cath.S/P PCI pLCX AILYN x1 and angioplasty OM1/ 2V CAD(LAD and LCX). patient echo showed EF of 40-45 %. patient started on  asa 81mg daily, brilinta 90mg q12, lisinopril  5  mg qd,  metoprolol tartrate 12.5 q12

## 2019-07-08 NOTE — PROGRESS NOTE ADULT - SUBJECTIVE AND OBJECTIVE BOX
Cardiology Follow up    YARI RAI   78y Male  PAST MEDICAL & SURGICAL HISTORY:  No pertinent past medical history  No significant past surgical history       HPI:  77 yo M with NO significant PMH and surgical history comes to the ED with the c/o chest pain x 1 day. The symptoms started today morning. He was feeling pale and had chest discomfort. He also passed out while sitting on the chair talking to the family he hit his head. Son found him pulseless and immediately initiated CPR. EMS was called, found to be in Vfib. ROSC obtained in ER, code STEMI called and pt went to cath lab. (06 Jul 2019 14:00)    Allergies    No Known Allergies    Intolerances      Patient reports chest soreness with movement/palpation, most likely musculoskeletal due to CPR  Denies CP, SOB, palpitations, or dizziness  No events on telemetry overnight    Vital Signs Last 24 Hrs  T(C): 37.2 (08 Jul 2019 08:00), Max: 37.2 (08 Jul 2019 08:00)  T(F): 98.9 (08 Jul 2019 08:00), Max: 98.9 (08 Jul 2019 08:00)  HR: 60 (08 Jul 2019 06:00) (56 - 70)  BP: 144/70 (08 Jul 2019 08:00) (108/59 - 154/77)  BP(mean): 96 (08 Jul 2019 08:00) (80 - 115)  RR: 18 (08 Jul 2019 08:00) (14 - 20)  SpO2: 95% (08 Jul 2019 08:00) (93% - 98%)    MEDICATIONS  (STANDING):  aspirin  chewable 81 milliGRAM(s) Chew daily  atorvastatin 80 milliGRAM(s) Oral at bedtime  enoxaparin Injectable 40 milliGRAM(s) SubCutaneous daily  lisinopril 2.5 milliGRAM(s) Oral daily  metoprolol tartrate 12.5 milliGRAM(s) Oral every 12 hours  morphine  - Injectable 1 milliGRAM(s) IV Push once  ticagrelor 90 milliGRAM(s) Oral every 12 hours    MEDICATIONS  (PRN):      REVIEW OF SYSTEMS:          CONSTITUTIONAL: No weakness, fevers or chills          EYES/ENT: No visual changes;  No vertigo or throat pain           NECK: No pain or stiffness          RESPIRATORY: No cough, wheezing, hemoptysis          CARDIOVASCULAR: no pain, no ADAMS, no palpitations           GASTROINTESTINAL: No abdominal or epigastric pain. No nausea, vomiting, or hematemesis;           GENITOURINARY: No dysuria, frequency or hematuria          NEUROLOGICAL: No numbness or weakness          SKIN: No itching, rashes    PHYSICAL EXAM:           CONSTITUTIONAL: Well-developed; well-nourished; in no acute distress  	SKIN: warm, dry  	HEAD: Normocephalic; atraumatic  	EYES: PERRL.  	ENT: No nasal discharge, airway clear, mucous membranes moist  	NECK: Supple; non tender.  	CARD: +S1, +S2, no murmurs, gallops, or rubs. (Regular) rate and rhythm    	RESP: No wheezes, rales or rhonchi. CTA B/L  	ABD: soft ntnd, + BS x 4 quadrants  	EXT: moves all extremities,  no clubbing, cyanosis or edema  	NEURO: Alert and oriented x3, no focal deficits          PSYCH: Cooperative, appropriate          VASCULAR:  + Rad / + PTs / + DPs          EXTREMITY  	   Right Radial: Dressing removed, access site soft, no hematoma, no pain, no numbness, no signs and symptoms of infection             ECG:  P                                                                                                                2D ECHO:   EXAM:  2-D ECHO (TTE) COMPLETE        PROCEDURE DATE:  07/07/2019      INTERPRETATION:  REPORT:    TRANSTHORACIC ECHOCARDIOGRAM REPORT         Patient Name:   YARI RAI Accession #: [id number]  Medical Rec #:  [id number]         Height:      68.0 in 172.7 cm  YOB: 1940         Weight:      167.0 lb 75.75 kg  Patient Age:    78 years          BSA:         1.89 m²  Patient Gender: M                 BP:          123/70 mmHg       Date of Exam:        7/7/2019 9:12:28 AM  Referring Physician: [id number] EDUARDO CERVANTES  Sonographer:         Ria Kong  Reading Physician:   Rancho Avery M.D.    Procedure:    2D Echo/Doppler/Color Doppler Complete.  Indications:  I21.3 - ST Elevation STEMI Myocardial Infarction of   unspecified  Diagnosis:    ST elevation (STEMI) myocardial infarction of unspecified   site -                I21.3  Study         Technically good study.  Details:         Summary:   1. Left ventricular ejection fraction, by visual estimation, is 40 to   45%.   2. Mildly decreased global left ventricular systolic function.   3. Multiple left ventricular regional wall motion abnormalities exist.   See wall motion findings.   4. LV Ejection Fraction by Bashir's Method with a biplane EF of 48 %.   5. Mildly increased left ventricular internal cavity size.   6. Spectral Doppler shows impaired relaxation pattern of left   ventricular myocardial filling (Grade I diastolic dysfunction).   7. Mild mitral valve regurgitation.   8. Mild thickening and calcification of the anterior mitral valve   leaflet.   9. Sclerotic aortic valve with normal opening.  10. LA volume Index is 22.7 ml/m² ml/m2.    PHYSICIAN INTERPRETATION:  Left Ventricle: The left ventricular internal cavity size is mildly   increased. Left ventricular wall thickness is normal. Global LV systolic   function was mildly decreased. Left ventricular ejection fraction, by   visual estimation, is 40 to 45%. Spectral Doppler shows impaired   relaxation pattern of left ventricular myocardialfilling (Grade I   diastolic dysfunction).       LV Wall Scoring:  The mid inferolateral segment and apical inferior segment are akinetic.   The  mid and apical anterior septum, mid and apical inferior septum, apical   lateral  segment, apical anterior segment, mid inferior segment, and apex are  hypokinetic. All remaining scored segments are normal.    Right Ventricle: Normal right ventricular size and function.  Left Atrium: Normal left atrial size. LA volume Index is 22.7 ml/m² ml/m2.  Right Atrium: Normal right atrial size.  Pericardium: There is no evidence of pericardial effusion.  Mitral Valve: The mitral valve is normal in structure. Mild thickening   and calcification of the anterior mitral valve leaflet. No evidence of   mitral stenosis. Mild mitral valve regurgitation is seen.  Tricuspid Valve: Structurally normal tricuspid valve, with normal leaflet   excursion. The tricuspid valve is normal in structure. Mild tricuspid   regurgitation is visualized.  Aortic Valve: Sclerotic aortic valve with normal opening. No evidence of   aortic valve regurgitation is seen.  Pulmonic Valve: Structurally normal pulmonic valve, with normal leaflet   excursion. The pulmonic valve is normal. No indication of pulmonic valve   regurgitation.  Aorta: The aortic root and ascending aorta are structurally normal, with   no evidence of dilitation.  Pulmonary Artery: The main pulmonary artery is normal in size.  Venous: The inferior vena cava was normal sized, with respiratory size   variation greater than 50%.       2D AND M-MODE MEASUREMENTS (normal ranges within parentheses):  Left Ventricle:                  Normal  IVSd (2D):              0.92 cm (0.7-1.1)  LVPWd (2D):             0.96 cm (0.7-1.1)  LVIDd (2D):             4.44 cm (3.4-5.7)  LVIDs (2D):             3.29 cm  LV FS (2D):             25.9 %   (>25%)  Relative Wall Thickness  0.43    (<0.42)    SPECTRAL DOPPLER ANALYSIS:  LV DIASTOLIC FUNCTION:  MV Peak E: 0.66 m/s Decel Time: 244 msec  MV Peak A: 0.92 m/s  E/A Ratio: 0.72    Aortic Valve:  AoV VMax:    1.54 m/s AoV Area, Vmax: 1.64 cm² Vmax Indx: 0.87 cm²/m²  AoV Pk Grad: 9.4 mmHg    LVOT Vmax: 1.03 m/s  LVOT VTI:  0.22 m  LVOT Diam: 1.77 cm    Mitral Valve:  MV P1/2 Time: 70.88 msec  MV Area, PHT: 3.10 cm²    Tricuspid Valve and PA/RV Systolic Pressure: TR Max Velocity: 2.45 m/s RA   Pressure: 5 mmHg RVSP/PASP: 29.1 mmHg       [id number] Rancho Avery M.D., Electronically signed on 7/7/2019 at 2:49:52   PM         *** Final ***                RANCHO AVERY M.D., RESIDENT RADIOLOGIST  This document has been electronically signed.  RANCHO AVERY M.D., RESIDENT RADIOLOGIST  This document has been electronically signed. Jul 7 2019  9:12AM                  LABS:                        13.1   9.91  )-----------( 217      ( 08 Jul 2019 04:58 )             40.5     07-08    139  |  103  |  20  ----------------------------<  109<H>  4.2   |  24  |  1.0    Ca    8.8      08 Jul 2019 04:58  Mg     2.2     07-06    TPro  7.1  /  Alb  4.0  /  TBili  0.5  /  DBili  x   /  AST  232<H>  /  ALT  248<H>  /  AlkPhos  70  07-06    CARDIAC MARKERS ( 08 Jul 2019 04:58 )  x     / 1.26 ng/mL / 418 U/L / x     / x      CARDIAC MARKERS ( 07 Jul 2019 05:37 )  x     / 1.97 ng/mL / x     / x     / x      CARDIAC MARKERS ( 06 Jul 2019 17:34 )  x     / x     / 472 U/L / x     / x      CARDIAC MARKERS ( 06 Jul 2019 10:31 )  x     / <0.01 ng/mL / x     / x     / x          LIVER FUNCTIONS - ( 06 Jul 2019 10:31 )  Alb: 4.0 g/dL / Pro: 7.1 g/dL / ALK PHOS: 70 U/L / ALT: 248 U/L / AST: 232 U/L / GGT: x             A/P:  I discussed the case with Cardiologist Dr. Stark  and recommend the following:    S/P PCI pLCX AILYN x1 and angioplasty OM1/ 2V CAD(LAD and LCX)  	     Continue DAPT (asa 81mg daily, brilinta 90mg q12),  B-Blocker, Statin Therapy, ACE                   confirm brilinta coverage prior to d/c                    CE trending down , EF 40-45%                   OOB-CH, ambulate with assistance                    Patient agreeing to take DAPT for at least one year or as directed by cardiologist                    Pt given instructions on importance of taking antiplatelet medication or risk acute stent thrombosis/death                   Post cath instructions, access site care and activity restrictions reviewed with patient                     Discussed with patient to return to hospital if experience chest pain, shortness breath, dizziness and site bleeding                   Aggressive risk factor modification, diet counseling, smoking cessation discussed with patient                       Can discharge patient from cardiac standpoint if ambulating without symptoms as per Dr. Stark                    Follow up with Cardiology Dr. Stark in 1-2  weeks.  Instructed to call and make an appointment

## 2019-07-08 NOTE — DISCHARGE NOTE PROVIDER - CARE PROVIDER_API CALL
Ky Sin)  Internal Medicine  [address] [place] [address]  Phone: [phone]  Fax: [phone]  Follow Up Time: 1 week    Mario Stark)  Cardiology; Interventional Cardiology  [address] [place] [address]  Phone: [phone]  Fax: [phone]  Follow Up Time: 2 weeks

## 2019-07-08 NOTE — PROGRESS NOTE ADULT - ASSESSMENT
Assessment and Plan:   	  79 yo M with no PMHx, no home meds, hasn't seen physician in years, presents to ER in vfib cardiac arrest. Pt obtained ROSC upon arrival, STEMI found.    STEMI  -S/P PCI pLCX AILYN x1 and angioplasty OM1/ 2V CAD(LAD and LCX)  - cardiac cath showing occulsion in LAD 40 % occlusion and LCx 95% occlusion  - cardiac enzymes improving   -c/w asa 81mg daily, brilinta 90mg q12, lisinopril 2.5 mg qd,  metoprolol tartrate 12.5 q12  -2d Echo - Left ventricular ejection fraction, by visual estimation, is 40 to   45%.    musculoskeletal chest pain  -tylenol prn     DVT px  Full Code  From home    diet- dash

## 2019-07-08 NOTE — DISCHARGE NOTE NURSING/CASE MANAGEMENT/SOCIAL WORK - NSDCDPATPORTLINK_GEN_ALL_CORE
You can access the [facility] Patient Portal, offered by [facility], by registering with the following website: [email or web address]

## 2019-07-08 NOTE — DISCHARGE NOTE PROVIDER - NSDCCPTREATMENT_GEN_ALL_CORE_FT
PRINCIPAL PROCEDURE  Procedure: PCI, with stent insertion  Findings and Treatment: we put a stent in your left circumflex artery

## 2019-07-12 DIAGNOSIS — I21.21 ST ELEVATION (STEMI) MYOCARDIAL INFARCTION INVOLVING LEFT CIRCUMFLEX CORONARY ARTERY: ICD-10-CM

## 2019-07-12 DIAGNOSIS — Z87.891 PERSONAL HISTORY OF NICOTINE DEPENDENCE: ICD-10-CM

## 2019-07-12 DIAGNOSIS — R07.89 OTHER CHEST PAIN: ICD-10-CM

## 2019-07-12 DIAGNOSIS — Z82.49 FAMILY HISTORY OF ISCHEMIC HEART DISEASE AND OTHER DISEASES OF THE CIRCULATORY SYSTEM: ICD-10-CM

## 2019-07-12 DIAGNOSIS — I46.9 CARDIAC ARREST, CAUSE UNSPECIFIED: ICD-10-CM

## 2019-07-12 DIAGNOSIS — I49.01 VENTRICULAR FIBRILLATION: ICD-10-CM

## 2019-07-16 PROBLEM — Z00.00 ENCOUNTER FOR PREVENTIVE HEALTH EXAMINATION: Status: ACTIVE | Noted: 2019-07-16

## 2019-07-26 ENCOUNTER — APPOINTMENT (OUTPATIENT)
Dept: CARDIOLOGY | Facility: CLINIC | Age: 79
End: 2019-07-26
Payer: MEDICARE

## 2019-07-26 ENCOUNTER — OTHER (OUTPATIENT)
Age: 79
End: 2019-07-26

## 2019-07-26 VITALS
HEART RATE: 66 BPM | WEIGHT: 143 LBS | SYSTOLIC BLOOD PRESSURE: 128 MMHG | HEIGHT: 68 IN | BODY MASS INDEX: 21.67 KG/M2 | DIASTOLIC BLOOD PRESSURE: 74 MMHG

## 2019-07-26 DIAGNOSIS — I25.2 OLD MYOCARDIAL INFARCTION: ICD-10-CM

## 2019-07-26 DIAGNOSIS — Z87.891 PERSONAL HISTORY OF NICOTINE DEPENDENCE: ICD-10-CM

## 2019-07-26 PROCEDURE — 93000 ELECTROCARDIOGRAM COMPLETE: CPT

## 2019-07-26 PROCEDURE — 99204 OFFICE O/P NEW MOD 45 MIN: CPT

## 2019-07-26 RX ORDER — METOPROLOL TARTRATE 25 MG/1
25 TABLET, FILM COATED ORAL TWICE DAILY
Qty: 90 | Refills: 3 | Status: DISCONTINUED | COMMUNITY
End: 2019-07-26

## 2019-07-26 RX ORDER — ASPIRIN 81 MG
81 TABLET, DELAYED RELEASE (ENTERIC COATED) ORAL DAILY
Refills: 0 | Status: ACTIVE | COMMUNITY

## 2019-07-26 NOTE — PHYSICAL EXAM
[Normal Appearance] : normal appearance [General Appearance - Well Developed] : well developed [Well Groomed] : well groomed [General Appearance - Well Nourished] : well nourished [No Deformities] : no deformities [Normal Oral Mucosa] : normal oral mucosa [No Oral Pallor] : no oral pallor [General Appearance - In No Acute Distress] : no acute distress [No Oral Cyanosis] : no oral cyanosis [Normal Jugular Venous A Waves Present] : normal jugular venous A waves present [No Jugular Venous Her A Waves] : no jugular venous her A waves [Normal Jugular Venous V Waves Present] : normal jugular venous V waves present [Respiration, Rhythm And Depth] : normal respiratory rhythm and effort [Auscultation Breath Sounds / Voice Sounds] : lungs were clear to auscultation bilaterally [Exaggerated Use Of Accessory Muscles For Inspiration] : no accessory muscle use [Murmurs] : no murmurs present [Heart Sounds] : normal S1 and S2 [Heart Rate And Rhythm] : heart rate and rhythm were normal [Abdomen Tenderness] : non-tender [Abdomen Soft] : soft [Abdomen Mass (___ Cm)] : no abdominal mass palpated [Cyanosis, Localized] : no localized cyanosis [Nail Clubbing] : no clubbing of the fingernails [Petechial Hemorrhages (___cm)] : no petechial hemorrhages [Skin Color & Pigmentation] : normal skin color and pigmentation [Skin Lesions] : no skin lesions [] : no rash [No Venous Stasis] : no venous stasis [No Skin Ulcers] : no skin ulcer [No Xanthoma] : no  xanthoma was observed

## 2019-08-02 NOTE — ASSESSMENT
[FreeTextEntry1] : 77 yo male with pmhx and presentation as above\par cad/ami/pic of lcx\par icm/dyslipidemia\par cont all meds\par change Brilinta to Plavix in the view of coverage issues\par 2d echo\par symptoms limited EST\par repeat labs\par aggressive risk modif\par diet and act as tolerated\par rtc post testing

## 2019-08-02 NOTE — HISTORY OF PRESENT ILLNESS
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

## 2019-08-30 ENCOUNTER — APPOINTMENT (OUTPATIENT)
Dept: CARDIOLOGY | Facility: CLINIC | Age: 79
End: 2019-08-30

## 2019-09-03 ENCOUNTER — APPOINTMENT (OUTPATIENT)
Dept: CARDIOLOGY | Facility: CLINIC | Age: 79
End: 2019-09-03

## 2019-09-06 ENCOUNTER — APPOINTMENT (OUTPATIENT)
Dept: CARDIOLOGY | Facility: CLINIC | Age: 79
End: 2019-09-06
Payer: MEDICARE

## 2019-09-06 PROCEDURE — 93015 CV STRESS TEST SUPVJ I&R: CPT

## 2019-09-06 PROCEDURE — 93306 TTE W/DOPPLER COMPLETE: CPT

## 2019-09-27 ENCOUNTER — APPOINTMENT (OUTPATIENT)
Dept: CARDIOLOGY | Facility: CLINIC | Age: 79
End: 2019-09-27
Payer: MEDICARE

## 2019-09-27 VITALS
BODY MASS INDEX: 21.67 KG/M2 | HEIGHT: 68 IN | SYSTOLIC BLOOD PRESSURE: 132 MMHG | WEIGHT: 143 LBS | DIASTOLIC BLOOD PRESSURE: 70 MMHG

## 2019-09-27 PROCEDURE — 99214 OFFICE O/P EST MOD 30 MIN: CPT

## 2019-09-27 NOTE — PHYSICAL EXAM
[Normal Appearance] : normal appearance [General Appearance - Well Developed] : well developed [Well Groomed] : well groomed [General Appearance - Well Nourished] : well nourished [No Deformities] : no deformities [General Appearance - In No Acute Distress] : no acute distress [Normal Oral Mucosa] : normal oral mucosa [No Oral Pallor] : no oral pallor [No Oral Cyanosis] : no oral cyanosis [Normal Jugular Venous V Waves Present] : normal jugular venous V waves present [No Jugular Venous Her A Waves] : no jugular venous her A waves [Normal Jugular Venous A Waves Present] : normal jugular venous A waves present [Respiration, Rhythm And Depth] : normal respiratory rhythm and effort [Exaggerated Use Of Accessory Muscles For Inspiration] : no accessory muscle use [Auscultation Breath Sounds / Voice Sounds] : lungs were clear to auscultation bilaterally [Heart Rate And Rhythm] : heart rate and rhythm were normal [Murmurs] : no murmurs present [Heart Sounds] : normal S1 and S2 [Abdomen Soft] : soft [Abdomen Tenderness] : non-tender [Abdomen Mass (___ Cm)] : no abdominal mass palpated [Nail Clubbing] : no clubbing of the fingernails [Cyanosis, Localized] : no localized cyanosis [Petechial Hemorrhages (___cm)] : no petechial hemorrhages [Skin Color & Pigmentation] : normal skin color and pigmentation [] : no rash [No Skin Ulcers] : no skin ulcer [Skin Lesions] : no skin lesions [No Venous Stasis] : no venous stasis [No Xanthoma] : no  xanthoma was observed

## 2019-09-27 NOTE — HISTORY OF PRESENT ILLNESS
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

## 2019-10-30 ENCOUNTER — RX RENEWAL (OUTPATIENT)
Age: 79
End: 2019-10-30

## 2020-01-31 ENCOUNTER — APPOINTMENT (OUTPATIENT)
Dept: CARDIOLOGY | Facility: CLINIC | Age: 80
End: 2020-01-31
Payer: MEDICARE

## 2020-01-31 VITALS
BODY MASS INDEX: 22.13 KG/M2 | DIASTOLIC BLOOD PRESSURE: 66 MMHG | SYSTOLIC BLOOD PRESSURE: 134 MMHG | WEIGHT: 146 LBS | HEART RATE: 69 BPM | HEIGHT: 68 IN

## 2020-01-31 PROCEDURE — 93000 ELECTROCARDIOGRAM COMPLETE: CPT

## 2020-01-31 PROCEDURE — 99214 OFFICE O/P EST MOD 30 MIN: CPT

## 2020-01-31 NOTE — PHYSICAL EXAM
[General Appearance - Well Developed] : well developed [Well Groomed] : well groomed [Normal Appearance] : normal appearance [General Appearance - In No Acute Distress] : no acute distress [General Appearance - Well Nourished] : well nourished [No Deformities] : no deformities [No Oral Pallor] : no oral pallor [Normal Oral Mucosa] : normal oral mucosa [No Oral Cyanosis] : no oral cyanosis [Normal Jugular Venous V Waves Present] : normal jugular venous V waves present [Normal Jugular Venous A Waves Present] : normal jugular venous A waves present [No Jugular Venous Her A Waves] : no jugular venous her A waves [Heart Sounds] : normal S1 and S2 [Heart Rate And Rhythm] : heart rate and rhythm were normal [Respiration, Rhythm And Depth] : normal respiratory rhythm and effort [Murmurs] : no murmurs present [Auscultation Breath Sounds / Voice Sounds] : lungs were clear to auscultation bilaterally [Abdomen Soft] : soft [Exaggerated Use Of Accessory Muscles For Inspiration] : no accessory muscle use [Abdomen Mass (___ Cm)] : no abdominal mass palpated [Abdomen Tenderness] : non-tender [Nail Clubbing] : no clubbing of the fingernails [Petechial Hemorrhages (___cm)] : no petechial hemorrhages [Cyanosis, Localized] : no localized cyanosis [] : no rash [Skin Color & Pigmentation] : normal skin color and pigmentation [No Venous Stasis] : no venous stasis [No Skin Ulcers] : no skin ulcer [Skin Lesions] : no skin lesions [No Xanthoma] : no  xanthoma was observed

## 2020-01-31 NOTE — ASSESSMENT
[FreeTextEntry1] : 78 yo male with pmhx and presentation as above\par cad/ami/pic of lcx\par icm/dyslipidemia\par cont all meds\par 2019 2d echo/EST - low risk , cont with med rx for cad\par repeat labs\par aggressive risk modif\par act as tolerated\par rtc 4 months

## 2020-01-31 NOTE — HISTORY OF PRESENT ILLNESS
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

## 2020-03-30 NOTE — ASSESSMENT
Needs medication refill scheduled for 3/30 at 3:30 pm.   [FreeTextEntry1] : 80 yo male with pmhx and presentation as above\par cad/ami/pic of lcx\par icm/dyslipidemia\par cont all meds\par 2d echo - nl lv fnx\par EST - no ischemia at mod workload\par repeat labs reviewed, low carb and low fat diet discussed\par aggressive risk modif\par act as tolerated\par rtc 4 months

## 2020-06-05 ENCOUNTER — APPOINTMENT (OUTPATIENT)
Dept: CARDIOLOGY | Facility: CLINIC | Age: 80
End: 2020-06-05
Payer: MEDICARE

## 2020-06-05 VITALS
SYSTOLIC BLOOD PRESSURE: 138 MMHG | HEART RATE: 63 BPM | BODY MASS INDEX: 21.07 KG/M2 | WEIGHT: 139 LBS | HEIGHT: 68 IN | TEMPERATURE: 98.3 F | DIASTOLIC BLOOD PRESSURE: 70 MMHG

## 2020-06-05 PROCEDURE — 93000 ELECTROCARDIOGRAM COMPLETE: CPT

## 2020-06-05 PROCEDURE — 99213 OFFICE O/P EST LOW 20 MIN: CPT

## 2020-06-05 NOTE — ASSESSMENT
[FreeTextEntry1] : 78 yo male with pmhx and presentation as above\par cad/ami/pic of lcx\par icm/dyslipidemia\par cont all meds\par 2019-  2d echo/EST - low risk , cont with med rx for cad\par repeat labs prior to next visit\par aggressive risk modif\par act as tolerated\par rtc 4 months

## 2020-06-05 NOTE — HISTORY OF PRESENT ILLNESS
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

## 2020-06-05 NOTE — PHYSICAL EXAM
[Normal Appearance] : normal appearance [General Appearance - Well Developed] : well developed [No Deformities] : no deformities [General Appearance - Well Nourished] : well nourished [Well Groomed] : well groomed [Normal Oral Mucosa] : normal oral mucosa [General Appearance - In No Acute Distress] : no acute distress [No Oral Pallor] : no oral pallor [No Oral Cyanosis] : no oral cyanosis [Normal Jugular Venous A Waves Present] : normal jugular venous A waves present [No Jugular Venous Her A Waves] : no jugular venous her A waves [Normal Jugular Venous V Waves Present] : normal jugular venous V waves present [Exaggerated Use Of Accessory Muscles For Inspiration] : no accessory muscle use [Respiration, Rhythm And Depth] : normal respiratory rhythm and effort [Auscultation Breath Sounds / Voice Sounds] : lungs were clear to auscultation bilaterally [Heart Rate And Rhythm] : heart rate and rhythm were normal [Heart Sounds] : normal S1 and S2 [Murmurs] : no murmurs present [Abdomen Soft] : soft [Abdomen Tenderness] : non-tender [Abdomen Mass (___ Cm)] : no abdominal mass palpated [Nail Clubbing] : no clubbing of the fingernails [Cyanosis, Localized] : no localized cyanosis [Petechial Hemorrhages (___cm)] : no petechial hemorrhages [Skin Color & Pigmentation] : normal skin color and pigmentation [] : no rash [No Venous Stasis] : no venous stasis [Skin Lesions] : no skin lesions [No Xanthoma] : no  xanthoma was observed [No Skin Ulcers] : no skin ulcer

## 2020-09-25 ENCOUNTER — APPOINTMENT (OUTPATIENT)
Dept: CARDIOLOGY | Facility: CLINIC | Age: 80
End: 2020-09-25
Payer: MEDICARE

## 2020-09-25 VITALS
DIASTOLIC BLOOD PRESSURE: 72 MMHG | HEIGHT: 68 IN | SYSTOLIC BLOOD PRESSURE: 142 MMHG | WEIGHT: 138 LBS | TEMPERATURE: 98.1 F | HEART RATE: 56 BPM | BODY MASS INDEX: 20.92 KG/M2

## 2020-09-25 PROCEDURE — 93000 ELECTROCARDIOGRAM COMPLETE: CPT

## 2020-09-25 PROCEDURE — 99214 OFFICE O/P EST MOD 30 MIN: CPT

## 2020-09-25 NOTE — PHYSICAL EXAM
[General Appearance - Well Developed] : well developed [Normal Appearance] : normal appearance [Well Groomed] : well groomed [General Appearance - Well Nourished] : well nourished [No Deformities] : no deformities [General Appearance - In No Acute Distress] : no acute distress [Normal Oral Mucosa] : normal oral mucosa [No Oral Pallor] : no oral pallor [No Oral Cyanosis] : no oral cyanosis [Normal Jugular Venous A Waves Present] : normal jugular venous A waves present [Normal Jugular Venous V Waves Present] : normal jugular venous V waves present [No Jugular Venous Hre A Waves] : no jugular venous her A waves [Respiration, Rhythm And Depth] : normal respiratory rhythm and effort [Exaggerated Use Of Accessory Muscles For Inspiration] : no accessory muscle use [Auscultation Breath Sounds / Voice Sounds] : lungs were clear to auscultation bilaterally [Heart Rate And Rhythm] : heart rate and rhythm were normal [Heart Sounds] : normal S1 and S2 [Murmurs] : no murmurs present [Abdomen Soft] : soft [Abdomen Tenderness] : non-tender [Abdomen Mass (___ Cm)] : no abdominal mass palpated [Nail Clubbing] : no clubbing of the fingernails [Cyanosis, Localized] : no localized cyanosis [Petechial Hemorrhages (___cm)] : no petechial hemorrhages [Skin Color & Pigmentation] : normal skin color and pigmentation [] : no rash [No Venous Stasis] : no venous stasis [Skin Lesions] : no skin lesions [No Skin Ulcers] : no skin ulcer [No Xanthoma] : no  xanthoma was observed [Oriented To Time, Place, And Person] : oriented to person, place, and time

## 2020-09-25 NOTE — HISTORY OF PRESENT ILLNESS
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

## 2020-09-25 NOTE — ASSESSMENT
[FreeTextEntry1] : 79 yo male with pmhx and presentation as above\par cad/ami/pic of lcx\par icm/dyslipidemia\par cont all meds\par 2019-  2d echo/EST - low risk , cont with med rx for cad\par repeat labs reviewed\par aggressive risk modif\par act as tolerated\par rtc 4 months

## 2020-10-02 ENCOUNTER — RX RENEWAL (OUTPATIENT)
Age: 80
End: 2020-10-02

## 2021-01-27 ENCOUNTER — APPOINTMENT (OUTPATIENT)
Dept: CARDIOLOGY | Facility: CLINIC | Age: 81
End: 2021-01-27
Payer: MEDICARE

## 2021-01-27 VITALS
TEMPERATURE: 97.7 F | SYSTOLIC BLOOD PRESSURE: 138 MMHG | DIASTOLIC BLOOD PRESSURE: 72 MMHG | BODY MASS INDEX: 21.07 KG/M2 | HEIGHT: 68 IN | WEIGHT: 139 LBS

## 2021-01-27 PROCEDURE — 93000 ELECTROCARDIOGRAM COMPLETE: CPT

## 2021-01-27 PROCEDURE — 99072 ADDL SUPL MATRL&STAF TM PHE: CPT

## 2021-01-27 PROCEDURE — 99214 OFFICE O/P EST MOD 30 MIN: CPT

## 2021-01-27 NOTE — ASSESSMENT
[FreeTextEntry1] : 79 yo male with pmhx and presentation as above\par cad/ami/pic of lcx\par icm/dyslipidemia\par cont all meds\par repeat stress echo, if low risk - med rx\par repeat labs with pmd\par aggressive risk modif\par act as tolerated\par rtc 4 months

## 2021-01-27 NOTE — HISTORY OF PRESENT ILLNESS
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

## 2021-01-27 NOTE — PHYSICAL EXAM
[General Appearance - Well Developed] : well developed [Normal Appearance] : normal appearance [Well Groomed] : well groomed [General Appearance - Well Nourished] : well nourished [No Deformities] : no deformities [General Appearance - In No Acute Distress] : no acute distress [Normal Oral Mucosa] : normal oral mucosa [No Oral Pallor] : no oral pallor [No Oral Cyanosis] : no oral cyanosis [Normal Jugular Venous A Waves Present] : normal jugular venous A waves present [Normal Jugular Venous V Waves Present] : normal jugular venous V waves present [No Jugular Venous Her A Waves] : no jugular venous her A waves [Respiration, Rhythm And Depth] : normal respiratory rhythm and effort [Auscultation Breath Sounds / Voice Sounds] : lungs were clear to auscultation bilaterally [Exaggerated Use Of Accessory Muscles For Inspiration] : no accessory muscle use [Heart Rate And Rhythm] : heart rate and rhythm were normal [Heart Sounds] : normal S1 and S2 [Murmurs] : no murmurs present [Abdomen Soft] : soft [Abdomen Tenderness] : non-tender [Abdomen Mass (___ Cm)] : no abdominal mass palpated [Nail Clubbing] : no clubbing of the fingernails [Petechial Hemorrhages (___cm)] : no petechial hemorrhages [Cyanosis, Localized] : no localized cyanosis [Skin Color & Pigmentation] : normal skin color and pigmentation [] : no rash [No Venous Stasis] : no venous stasis [Skin Lesions] : no skin lesions [No Skin Ulcers] : no skin ulcer [No Xanthoma] : no  xanthoma was observed [Oriented To Time, Place, And Person] : oriented to person, place, and time

## 2021-03-16 ENCOUNTER — APPOINTMENT (OUTPATIENT)
Dept: CARDIOLOGY | Facility: CLINIC | Age: 81
End: 2021-03-16
Payer: MEDICARE

## 2021-03-16 PROCEDURE — 99072 ADDL SUPL MATRL&STAF TM PHE: CPT

## 2021-03-16 PROCEDURE — 93320 DOPPLER ECHO COMPLETE: CPT

## 2021-03-16 PROCEDURE — 93325 DOPPLER ECHO COLOR FLOW MAPG: CPT

## 2021-03-16 PROCEDURE — 93351 STRESS TTE COMPLETE: CPT

## 2021-05-19 ENCOUNTER — APPOINTMENT (OUTPATIENT)
Dept: CARDIOLOGY | Facility: CLINIC | Age: 81
End: 2021-05-19
Payer: MEDICARE

## 2021-05-19 VITALS
DIASTOLIC BLOOD PRESSURE: 68 MMHG | TEMPERATURE: 98.2 F | HEIGHT: 68 IN | HEART RATE: 56 BPM | WEIGHT: 138 LBS | BODY MASS INDEX: 20.92 KG/M2 | SYSTOLIC BLOOD PRESSURE: 136 MMHG

## 2021-05-19 PROCEDURE — 99072 ADDL SUPL MATRL&STAF TM PHE: CPT

## 2021-05-19 PROCEDURE — 99214 OFFICE O/P EST MOD 30 MIN: CPT

## 2021-05-19 PROCEDURE — 93000 ELECTROCARDIOGRAM COMPLETE: CPT

## 2021-05-19 NOTE — PHYSICAL EXAM
[Well Developed] : well developed [No Acute Distress] : no acute distress [Well Nourished] : well nourished [Normal Venous Pressure] : normal venous pressure [No Carotid Bruit] : no carotid bruit [Normal S1, S2] : normal S1, S2 [No Murmur] : no murmur [No Rub] : no rub [No Gallop] : no gallop [Clear Lung Fields] : clear lung fields [Good Air Entry] : good air entry [No Respiratory Distress] : no respiratory distress  [Soft] : abdomen soft [Non Tender] : non-tender [No Masses/organomegaly] : no masses/organomegaly [Normal Bowel Sounds] : normal bowel sounds [Normal Gait] : normal gait [No Edema] : no edema [No Cyanosis] : no cyanosis [No Clubbing] : no clubbing [No Varicosities] : no varicosities [No Rash] : no rash [No Skin Lesions] : no skin lesions [Moves all extremities] : moves all extremities [No Focal Deficits] : no focal deficits [Normal Speech] : normal speech [Alert and Oriented] : alert and oriented [Normal memory] : normal memory

## 2021-05-19 NOTE — ASSESSMENT
[FreeTextEntry1] : 81 yo male with pmhx and presentation as above\par cad/ami/pic of lcx\par icm/dyslipidemia\par cont all meds\par repeat stress echo- low risk - med rx\par repeat labs with pmd reviewed, FBG noted, diet discussed\par aggressive risk modif\par act as tolerated\par rtc 4 months

## 2021-05-19 NOTE — HISTORY OF PRESENT ILLNESS
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

## 2021-09-05 ENCOUNTER — RX RENEWAL (OUTPATIENT)
Age: 81
End: 2021-09-05

## 2021-10-29 ENCOUNTER — APPOINTMENT (OUTPATIENT)
Dept: CARDIOLOGY | Facility: CLINIC | Age: 81
End: 2021-10-29
Payer: MEDICARE

## 2021-10-29 VITALS
HEART RATE: 53 BPM | BODY MASS INDEX: 20.31 KG/M2 | WEIGHT: 134 LBS | SYSTOLIC BLOOD PRESSURE: 175 MMHG | DIASTOLIC BLOOD PRESSURE: 72 MMHG | HEIGHT: 68 IN | TEMPERATURE: 97.7 F

## 2021-10-29 PROCEDURE — 93000 ELECTROCARDIOGRAM COMPLETE: CPT

## 2021-10-29 PROCEDURE — 99214 OFFICE O/P EST MOD 30 MIN: CPT

## 2021-10-31 NOTE — ASSESSMENT
[FreeTextEntry1] : 82 yo male with pmhx and presentation as above\par cad/ami/pic of lcx\par icm/dyslipidemia\par cont all meds\par 3/21 stress echo- low risk - med rx\par repeat labs with pmd reviewed, FBG noted, diet discussed\par pt advised to keep bp logs at home, if cont to be elevated - increase acei\par aggressive risk modif\par act as tolerated\par rtc 4 months

## 2021-10-31 NOTE — HISTORY OF PRESENT ILLNESS
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

## 2022-04-01 ENCOUNTER — APPOINTMENT (OUTPATIENT)
Dept: CARDIOLOGY | Facility: CLINIC | Age: 82
End: 2022-04-01
Payer: MEDICARE

## 2022-04-01 VITALS
HEIGHT: 68 IN | BODY MASS INDEX: 20.92 KG/M2 | WEIGHT: 138 LBS | HEART RATE: 52 BPM | SYSTOLIC BLOOD PRESSURE: 130 MMHG | DIASTOLIC BLOOD PRESSURE: 70 MMHG

## 2022-04-01 PROCEDURE — 99213 OFFICE O/P EST LOW 20 MIN: CPT

## 2022-04-01 PROCEDURE — 93000 ELECTROCARDIOGRAM COMPLETE: CPT

## 2022-04-01 NOTE — ASSESSMENT
[FreeTextEntry1] : 82 yo male with pmhx and presentation as above\par cad/ami/pic of lcx\par icm/dyslipidemia\par cont all meds\par 3/21 stress echo- low risk - med rx\par repeat labs with pmd \par aggressive risk modif\par act as tolerated\par rtc 6 months

## 2022-04-01 NOTE — HISTORY OF PRESENT ILLNESS
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

## 2022-05-24 ENCOUNTER — APPOINTMENT (OUTPATIENT)
Dept: UROLOGY | Facility: CLINIC | Age: 82
End: 2022-05-24
Payer: MEDICARE

## 2022-05-24 PROCEDURE — 99203 OFFICE O/P NEW LOW 30 MIN: CPT

## 2022-05-26 ENCOUNTER — TRANSCRIPTION ENCOUNTER (OUTPATIENT)
Age: 82
End: 2022-05-26

## 2022-05-26 ENCOUNTER — OUTPATIENT (OUTPATIENT)
Dept: OUTPATIENT SERVICES | Facility: HOSPITAL | Age: 82
LOS: 1 days | Discharge: HOME | End: 2022-05-26
Payer: MEDICARE

## 2022-05-26 ENCOUNTER — RESULT REVIEW (OUTPATIENT)
Age: 82
End: 2022-05-26

## 2022-05-26 VITALS
SYSTOLIC BLOOD PRESSURE: 126 MMHG | TEMPERATURE: 98 F | DIASTOLIC BLOOD PRESSURE: 74 MMHG | HEART RATE: 66 BPM | WEIGHT: 134.92 LBS | HEIGHT: 67 IN | RESPIRATION RATE: 16 BRPM

## 2022-05-26 VITALS
SYSTOLIC BLOOD PRESSURE: 157 MMHG | OXYGEN SATURATION: 100 % | RESPIRATION RATE: 22 BRPM | DIASTOLIC BLOOD PRESSURE: 69 MMHG | HEART RATE: 62 BPM

## 2022-05-26 PROCEDURE — 88305 TISSUE EXAM BY PATHOLOGIST: CPT | Mod: 26

## 2022-05-26 RX ORDER — CLOPIDOGREL BISULFATE 75 MG/1
1 TABLET, FILM COATED ORAL
Qty: 0 | Refills: 0 | DISCHARGE

## 2022-05-26 NOTE — ASU PATIENT PROFILE, ADULT - NSICDXPASTMEDICALHX_GEN_ALL_CORE_FT
PAST MEDICAL HISTORY:  Acute myocardial infarction     HTN (hypertension)     Hypercholesterolemia

## 2022-05-26 NOTE — ASU DISCHARGE PLAN (ADULT/PEDIATRIC) - NS MD DC FALL RISK RISK
For information on Fall & Injury Prevention, visit: [email or web address] OR  [email or web address] OR  https://www.cdc.gov/steadi/patient.html

## 2022-05-26 NOTE — CHART NOTE - NSCHARTNOTEFT_GEN_A_CORE
PACU ANESTHESIA ADMISSION NOTE      Procedure: colonoscopy  Post op diagnosis:      ____  Intubated  TV:______       Rate: ______      FiO2: ______    _x___  Patent Airway    _x___  Full return of protective reflexes    _x___  Full recovery from anesthesia / back to baseline status    Vitals:  T(C): 36  HR: 59  BP: 110/67  RR: 16  SpO2: 99% on RA    Mental Status:  _x___ Awake   _x____ Alert   _____ Drowsy   _____ Sedated    Nausea/Vomiting:  _x___  NO       ______Yes,   See Post - Op Orders         Pain Scale (0-10):  __0___    Treatment: _x___ None    ____ See Post - Op/PCA Orders    Post - Operative Fluids:   __x__ Oral   ____ See Post - Op Orders    Plan: Discharge:   _x___Home       _____Floor     _____Critical Care    _____  Other:_________________    Comments:  No anesthesia issues or complications noted.  Discharge when criteria met.

## 2022-05-26 NOTE — H&P PST ADULT - CARDIOVASCULAR
Pt ambulates to infusion center for labs and treatment.  IVAD accessed, labs drawn et sent w/results noted.  IVAD flushed w/NS et heparin and needle deaccessed.  Velcade and Vitamin B12 injection given SQ without difficulty.  Pt left clinic stable to [facility].  Plan RTC as scheduled.   Regular rate & rhythm, normal S1, S2; no murmurs, gallops or rubs; no S3, S4

## 2022-05-31 LAB — SURGICAL PATHOLOGY STUDY: SIGNIFICANT CHANGE UP

## 2022-06-01 DIAGNOSIS — K64.8 OTHER HEMORRHOIDS: ICD-10-CM

## 2022-06-01 DIAGNOSIS — Z79.82 LONG TERM (CURRENT) USE OF ASPIRIN: ICD-10-CM

## 2022-06-01 DIAGNOSIS — I10 ESSENTIAL (PRIMARY) HYPERTENSION: ICD-10-CM

## 2022-06-01 DIAGNOSIS — K57.30 DIVERTICULOSIS OF LARGE INTESTINE WITHOUT PERFORATION OR ABSCESS WITHOUT BLEEDING: ICD-10-CM

## 2022-06-01 DIAGNOSIS — Z79.02 LONG TERM (CURRENT) USE OF ANTITHROMBOTICS/ANTIPLATELETS: ICD-10-CM

## 2022-06-01 DIAGNOSIS — E78.00 PURE HYPERCHOLESTEROLEMIA, UNSPECIFIED: ICD-10-CM

## 2022-06-01 DIAGNOSIS — I25.2 OLD MYOCARDIAL INFARCTION: ICD-10-CM

## 2022-06-01 DIAGNOSIS — Z12.11 ENCOUNTER FOR SCREENING FOR MALIGNANT NEOPLASM OF COLON: ICD-10-CM

## 2022-06-01 DIAGNOSIS — Z79.899 OTHER LONG TERM (CURRENT) DRUG THERAPY: ICD-10-CM

## 2022-07-18 PROBLEM — E78.00 PURE HYPERCHOLESTEROLEMIA, UNSPECIFIED: Chronic | Status: ACTIVE | Noted: 2022-05-26

## 2022-07-18 PROBLEM — I10 ESSENTIAL (PRIMARY) HYPERTENSION: Chronic | Status: ACTIVE | Noted: 2022-05-26

## 2022-07-18 PROBLEM — I21.9 ACUTE MYOCARDIAL INFARCTION, UNSPECIFIED: Chronic | Status: ACTIVE | Noted: 2022-05-26

## 2022-09-02 ENCOUNTER — APPOINTMENT (OUTPATIENT)
Dept: CARDIOLOGY | Facility: CLINIC | Age: 82
End: 2022-09-02

## 2022-09-02 VITALS
HEIGHT: 68 IN | SYSTOLIC BLOOD PRESSURE: 140 MMHG | HEART RATE: 53 BPM | DIASTOLIC BLOOD PRESSURE: 70 MMHG | BODY MASS INDEX: 20.92 KG/M2 | WEIGHT: 138 LBS

## 2022-09-02 PROCEDURE — 99213 OFFICE O/P EST LOW 20 MIN: CPT | Mod: 25

## 2022-09-02 PROCEDURE — 93000 ELECTROCARDIOGRAM COMPLETE: CPT

## 2022-09-02 RX ORDER — CLOPIDOGREL BISULFATE 75 MG/1
75 TABLET, FILM COATED ORAL DAILY
Qty: 90 | Refills: 3 | Status: DISCONTINUED | COMMUNITY
Start: 2019-07-26 | End: 2022-09-02

## 2022-09-02 NOTE — ASSESSMENT
[FreeTextEntry1] : 81 yo male with pmhx and presentation as above\par cad/ami/pic of lcx\par icm/dyslipidemia\par cont all meds\par 3/21 stress echo- low risk - med rx\par repeat labs with pmd reviewed\par aggressive risk modif\par act as tolerated\par rtc 6 months

## 2022-09-02 NOTE — HISTORY OF PRESENT ILLNESS
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

## 2022-09-23 ENCOUNTER — APPOINTMENT (OUTPATIENT)
Dept: CARDIOLOGY | Facility: CLINIC | Age: 82
End: 2022-09-23

## 2022-11-29 ENCOUNTER — APPOINTMENT (OUTPATIENT)
Dept: UROLOGY | Facility: CLINIC | Age: 82
End: 2022-11-29

## 2022-11-29 VITALS
OXYGEN SATURATION: 99 % | HEART RATE: 55 BPM | TEMPERATURE: 98 F | HEIGHT: 68 IN | WEIGHT: 138 LBS | BODY MASS INDEX: 20.92 KG/M2 | SYSTOLIC BLOOD PRESSURE: 129 MMHG | RESPIRATION RATE: 14 BRPM | DIASTOLIC BLOOD PRESSURE: 58 MMHG

## 2022-11-29 LAB
BILIRUB UR QL STRIP: NORMAL
COLLECTION METHOD: NORMAL
GLUCOSE UR-MCNC: NORMAL
HCG UR QL: 0.2 EU/DL
HGB UR QL STRIP.AUTO: NORMAL
KETONES UR-MCNC: NORMAL
LEUKOCYTE ESTERASE UR QL STRIP: NORMAL
NITRITE UR QL STRIP: NORMAL
PH UR STRIP: 5
PROT UR STRIP-MCNC: NORMAL
SP GR UR STRIP: 1.02

## 2022-11-29 PROCEDURE — 99213 OFFICE O/P EST LOW 20 MIN: CPT

## 2022-11-29 PROCEDURE — 81003 URINALYSIS AUTO W/O SCOPE: CPT | Mod: QW

## 2023-03-14 ENCOUNTER — APPOINTMENT (OUTPATIENT)
Dept: CARDIOLOGY | Facility: CLINIC | Age: 83
End: 2023-03-14
Payer: MEDICARE

## 2023-03-14 VITALS
SYSTOLIC BLOOD PRESSURE: 138 MMHG | HEIGHT: 68 IN | BODY MASS INDEX: 22.43 KG/M2 | HEART RATE: 54 BPM | WEIGHT: 148 LBS | DIASTOLIC BLOOD PRESSURE: 80 MMHG

## 2023-03-14 PROCEDURE — 99214 OFFICE O/P EST MOD 30 MIN: CPT | Mod: 25

## 2023-03-14 PROCEDURE — 93000 ELECTROCARDIOGRAM COMPLETE: CPT

## 2023-03-14 NOTE — HISTORY OF PRESENT ILLNESS
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

## 2023-03-14 NOTE — ASSESSMENT
[FreeTextEntry1] : 83 yo male with pmhx and presentation as above\par cad/ami/pic of lcx\par icm/dyslipidemia\par cont all meds\par repeat stress echo and labs before the next visit\par aggressive risk modif\par act as tolerated\par rtc 6 months

## 2023-05-11 ENCOUNTER — RX RENEWAL (OUTPATIENT)
Age: 83
End: 2023-05-11

## 2023-05-11 RX ORDER — LISINOPRIL 2.5 MG/1
2.5 TABLET ORAL
Qty: 100 | Refills: 2 | Status: ACTIVE | COMMUNITY
Start: 2021-09-05 | End: 1900-01-01

## 2023-05-15 ENCOUNTER — APPOINTMENT (OUTPATIENT)
Dept: CARDIOLOGY | Facility: CLINIC | Age: 83
End: 2023-05-15
Payer: MEDICARE

## 2023-05-15 PROCEDURE — 93320 DOPPLER ECHO COMPLETE: CPT

## 2023-05-15 PROCEDURE — 93351 STRESS TTE COMPLETE: CPT

## 2023-05-15 PROCEDURE — 93325 DOPPLER ECHO COLOR FLOW MAPG: CPT

## 2023-05-30 ENCOUNTER — APPOINTMENT (OUTPATIENT)
Dept: UROLOGY | Facility: CLINIC | Age: 83
End: 2023-05-30
Payer: MEDICARE

## 2023-05-30 VITALS
DIASTOLIC BLOOD PRESSURE: 64 MMHG | SYSTOLIC BLOOD PRESSURE: 158 MMHG | HEART RATE: 52 BPM | WEIGHT: 148 LBS | HEIGHT: 68 IN | BODY MASS INDEX: 22.43 KG/M2

## 2023-05-30 DIAGNOSIS — N40.1 BENIGN PROSTATIC HYPERPLASIA WITH LOWER URINARY TRACT SYMPMS: ICD-10-CM

## 2023-05-30 DIAGNOSIS — N13.8 BENIGN PROSTATIC HYPERPLASIA WITH LOWER URINARY TRACT SYMPMS: ICD-10-CM

## 2023-05-30 DIAGNOSIS — R97.20 ELEVATED PROSTATE, SPECIFIC ANTIGEN [PSA]: ICD-10-CM

## 2023-05-30 DIAGNOSIS — R39.9 UNSPECIFIED SYMPTOMS AND SIGNS INVOLVING THE GENITOURINARY SYSTEM: ICD-10-CM

## 2023-05-30 PROCEDURE — 99213 OFFICE O/P EST LOW 20 MIN: CPT

## 2023-09-12 ENCOUNTER — APPOINTMENT (OUTPATIENT)
Dept: CARDIOLOGY | Facility: CLINIC | Age: 83
End: 2023-09-12
Payer: MEDICARE

## 2023-09-12 ENCOUNTER — RX RENEWAL (OUTPATIENT)
Age: 83
End: 2023-09-12

## 2023-09-12 VITALS
SYSTOLIC BLOOD PRESSURE: 110 MMHG | WEIGHT: 145 LBS | HEART RATE: 47 BPM | DIASTOLIC BLOOD PRESSURE: 68 MMHG | BODY MASS INDEX: 21.98 KG/M2 | HEIGHT: 68 IN

## 2023-09-12 DIAGNOSIS — I10 ESSENTIAL (PRIMARY) HYPERTENSION: ICD-10-CM

## 2023-09-12 DIAGNOSIS — E78.5 HYPERLIPIDEMIA, UNSPECIFIED: ICD-10-CM

## 2023-09-12 DIAGNOSIS — E74.39 OTHER DISORDERS OF INTESTINAL CARBOHYDRATE ABSORPTION: ICD-10-CM

## 2023-09-12 DIAGNOSIS — I25.10 ATHEROSCLEROTIC HEART DISEASE OF NATIVE CORONARY ARTERY W/OUT ANGINA PECTORIS: ICD-10-CM

## 2023-09-12 PROCEDURE — 93000 ELECTROCARDIOGRAM COMPLETE: CPT

## 2023-09-12 PROCEDURE — 99214 OFFICE O/P EST MOD 30 MIN: CPT | Mod: 25

## 2023-09-12 RX ORDER — ATORVASTATIN CALCIUM 80 MG/1
80 TABLET, FILM COATED ORAL
Qty: 100 | Refills: 2 | Status: ACTIVE | COMMUNITY
Start: 2019-10-30 | End: 1900-01-01

## 2023-11-30 ENCOUNTER — APPOINTMENT (OUTPATIENT)
Dept: UROLOGY | Facility: CLINIC | Age: 83
End: 2023-11-30

## 2024-04-04 ENCOUNTER — RX RENEWAL (OUTPATIENT)
Age: 84
End: 2024-04-04

## 2024-04-04 RX ORDER — METOPROLOL SUCCINATE 25 MG/1
25 TABLET, EXTENDED RELEASE ORAL
Qty: 100 | Refills: 2 | Status: ACTIVE | COMMUNITY
Start: 2019-07-26 | End: 1900-01-01

## 2024-07-05 ENCOUNTER — RX RENEWAL (OUTPATIENT)
Age: 84
End: 2024-07-05

## 2024-09-10 ENCOUNTER — APPOINTMENT (OUTPATIENT)
Dept: CARDIOLOGY | Facility: CLINIC | Age: 84
End: 2024-09-10
Payer: MEDICARE

## 2024-09-10 VITALS
SYSTOLIC BLOOD PRESSURE: 140 MMHG | DIASTOLIC BLOOD PRESSURE: 70 MMHG | HEART RATE: 49 BPM | WEIGHT: 146 LBS | HEIGHT: 68 IN | BODY MASS INDEX: 22.13 KG/M2

## 2024-09-10 DIAGNOSIS — I25.10 ATHEROSCLEROTIC HEART DISEASE OF NATIVE CORONARY ARTERY W/OUT ANGINA PECTORIS: ICD-10-CM

## 2024-09-10 DIAGNOSIS — I10 ESSENTIAL (PRIMARY) HYPERTENSION: ICD-10-CM

## 2024-09-10 DIAGNOSIS — E78.5 HYPERLIPIDEMIA, UNSPECIFIED: ICD-10-CM

## 2024-09-10 DIAGNOSIS — E74.39 OTHER DISORDERS OF INTESTINAL CARBOHYDRATE ABSORPTION: ICD-10-CM

## 2024-09-10 PROCEDURE — 93000 ELECTROCARDIOGRAM COMPLETE: CPT

## 2024-09-10 PROCEDURE — 99214 OFFICE O/P EST MOD 30 MIN: CPT | Mod: 25

## 2024-09-10 RX ORDER — EZETIMIBE 10 MG/1
10 TABLET ORAL DAILY
Qty: 90 | Refills: 3 | Status: ACTIVE | COMMUNITY
Start: 2024-09-10 | End: 1900-01-01

## 2024-09-10 NOTE — ASSESSMENT
[FreeTextEntry1] : 85 yo male with pmhx and presentation as above cad/ami/pic of lcx icm/dyslipidemia cont all meds repeat labs reviewed and d/w the pt add zetia 10 daily aggressive risk modif act as tolerated rtc 6-9  months, labs few weeks prior

## 2024-09-10 NOTE — HISTORY OF PRESENT ILLNESS
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

## 2024-09-10 NOTE — REASON FOR VISIT
FYI,  I sent refill for the naproxen to [facility].  Not clear to me what ear drop was intended.    John Coelho   [Symptom and Test Evaluation] : symptom and test evaluation [Hyperlipidemia] : hyperlipidemia [Hypertension] : hypertension [Coronary Artery Disease] : coronary artery disease

## 2025-03-21 ENCOUNTER — APPOINTMENT (OUTPATIENT)
Dept: CARDIOLOGY | Facility: CLINIC | Age: 85
End: 2025-03-21
Payer: MEDICARE

## 2025-03-21 ENCOUNTER — NON-APPOINTMENT (OUTPATIENT)
Age: 85
End: 2025-03-21

## 2025-03-21 VITALS
HEIGHT: 68 IN | HEART RATE: 50 BPM | WEIGHT: 146 LBS | BODY MASS INDEX: 22.13 KG/M2 | DIASTOLIC BLOOD PRESSURE: 73 MMHG | SYSTOLIC BLOOD PRESSURE: 120 MMHG

## 2025-03-21 DIAGNOSIS — E74.39 OTHER DISORDERS OF INTESTINAL CARBOHYDRATE ABSORPTION: ICD-10-CM

## 2025-03-21 DIAGNOSIS — E78.5 HYPERLIPIDEMIA, UNSPECIFIED: ICD-10-CM

## 2025-03-21 DIAGNOSIS — I10 ESSENTIAL (PRIMARY) HYPERTENSION: ICD-10-CM

## 2025-03-21 DIAGNOSIS — I25.10 ATHEROSCLEROTIC HEART DISEASE OF NATIVE CORONARY ARTERY W/OUT ANGINA PECTORIS: ICD-10-CM

## 2025-03-21 PROCEDURE — 99214 OFFICE O/P EST MOD 30 MIN: CPT | Mod: 25

## 2025-03-21 PROCEDURE — 93000 ELECTROCARDIOGRAM COMPLETE: CPT
